# Patient Record
Sex: FEMALE | Race: WHITE | Employment: UNEMPLOYED | URBAN - METROPOLITAN AREA
[De-identification: names, ages, dates, MRNs, and addresses within clinical notes are randomized per-mention and may not be internally consistent; named-entity substitution may affect disease eponyms.]

---

## 2019-02-27 ENCOUNTER — TELEPHONE (OUTPATIENT)
Dept: OBGYN CLINIC | Facility: CLINIC | Age: 34
End: 2019-02-27

## 2019-02-27 NOTE — TELEPHONE ENCOUNTER
PT AGREED TO SEE GROUP. BOOKED OBN APPT 3-20-19. PT WILL BE SEEING PCP TOMORROW AND HAS REQUESTED A URINE AND BLOOD TEST. ADVISED TO BRING A COPY OF THE RESULT TO HER APPT SO WE DON'T HAVE TO DO URINE PREG TEST AT THE TIME OF OBN APPT.    ADVISED TO STAR

## 2019-03-20 ENCOUNTER — LAB ENCOUNTER (OUTPATIENT)
Dept: LAB | Facility: HOSPITAL | Age: 34
End: 2019-03-20
Attending: OBSTETRICS & GYNECOLOGY
Payer: COMMERCIAL

## 2019-03-20 ENCOUNTER — NURSE ONLY (OUTPATIENT)
Dept: OBGYN CLINIC | Facility: CLINIC | Age: 34
End: 2019-03-20
Payer: COMMERCIAL

## 2019-03-20 VITALS — HEIGHT: 66 IN | WEIGHT: 196 LBS | BODY MASS INDEX: 31.5 KG/M2

## 2019-03-20 DIAGNOSIS — Z34.81 ENCOUNTER FOR SUPERVISION OF OTHER NORMAL PREGNANCY IN FIRST TRIMESTER: ICD-10-CM

## 2019-03-20 DIAGNOSIS — Z34.81 ENCOUNTER FOR SUPERVISION OF OTHER NORMAL PREGNANCY IN FIRST TRIMESTER: Primary | ICD-10-CM

## 2019-03-20 LAB
ANTIBODY SCREEN: NEGATIVE
BASOPHILS # BLD AUTO: 0.04 X10(3) UL (ref 0–0.2)
BASOPHILS NFR BLD AUTO: 0.4 %
DEPRECATED RDW RBC AUTO: 48.4 FL (ref 35.1–46.3)
EOSINOPHIL # BLD AUTO: 0.11 X10(3) UL (ref 0–0.7)
EOSINOPHIL NFR BLD AUTO: 1.2 %
ERYTHROCYTE [DISTWIDTH] IN BLOOD BY AUTOMATED COUNT: 14.3 % (ref 11–15)
HBV SURFACE AG SER-ACNC: <0.1 [IU]/L
HBV SURFACE AG SERPL QL IA: NONREACTIVE
HCT VFR BLD AUTO: 41.3 % (ref 35–48)
HCV AB SERPL QL IA: NONREACTIVE
HGB BLD-MCNC: 13.3 G/DL (ref 12–16)
IMM GRANULOCYTES # BLD AUTO: 0.04 X10(3) UL (ref 0–1)
IMM GRANULOCYTES NFR BLD: 0.4 %
LYMPHOCYTES # BLD AUTO: 2.21 X10(3) UL (ref 1–4)
LYMPHOCYTES NFR BLD AUTO: 24.8 %
MCH RBC QN AUTO: 30 PG (ref 26–34)
MCHC RBC AUTO-ENTMCNC: 32.2 G/DL (ref 31–37)
MCV RBC AUTO: 93 FL (ref 80–100)
MONOCYTES # BLD AUTO: 0.74 X10(3) UL (ref 0.1–1)
MONOCYTES NFR BLD AUTO: 8.3 %
NEUTROPHILS # BLD AUTO: 5.76 X10 (3) UL (ref 1.5–7.7)
NEUTROPHILS # BLD AUTO: 5.76 X10(3) UL (ref 1.5–7.7)
NEUTROPHILS NFR BLD AUTO: 64.9 %
PLATELET # BLD AUTO: 233 10(3)UL (ref 150–450)
RBC # BLD AUTO: 4.44 X10(6)UL (ref 3.8–5.3)
RH BLOOD TYPE: POSITIVE
RUBV IGG SER QL: POSITIVE
RUBV IGG SER-ACNC: 75.5 IU/ML (ref 10–?)
WBC # BLD AUTO: 8.9 X10(3) UL (ref 4–11)

## 2019-03-20 PROCEDURE — 87086 URINE CULTURE/COLONY COUNT: CPT

## 2019-03-20 PROCEDURE — 86850 RBC ANTIBODY SCREEN: CPT

## 2019-03-20 PROCEDURE — 86900 BLOOD TYPING SEROLOGIC ABO: CPT

## 2019-03-20 PROCEDURE — 86780 TREPONEMA PALLIDUM: CPT

## 2019-03-20 PROCEDURE — 86787 VARICELLA-ZOSTER ANTIBODY: CPT

## 2019-03-20 PROCEDURE — 87389 HIV-1 AG W/HIV-1&-2 AB AG IA: CPT

## 2019-03-20 PROCEDURE — 87340 HEPATITIS B SURFACE AG IA: CPT

## 2019-03-20 PROCEDURE — 86762 RUBELLA ANTIBODY: CPT

## 2019-03-20 PROCEDURE — 85025 COMPLETE CBC W/AUTO DIFF WBC: CPT

## 2019-03-20 PROCEDURE — 86803 HEPATITIS C AB TEST: CPT

## 2019-03-20 PROCEDURE — 86901 BLOOD TYPING SEROLOGIC RH(D): CPT

## 2019-03-20 PROCEDURE — 36415 COLL VENOUS BLD VENIPUNCTURE: CPT

## 2019-03-20 RX ORDER — GLYCERIN/MINERAL OIL
LOTION (ML) TOPICAL
COMMUNITY
End: 2019-05-25

## 2019-03-20 NOTE — PROGRESS NOTES
Pt seen for OBN appt today with no complaints. Normal PN labs ordered, 1 hr gtt, Hep C and varicella. Pt advised all labs must be completed and resulted prior to MD appt.   Pt walked to  to schedule NPN appt with MD.    Pt brought in proof of pr 12/2018. Pt going to [facility] 3/2019.  (Partner is not going.)  Informed pt that it is not rec to go to [facility].   Pets No        GENETICS SCREENING    Patient greater than 35 Yes    Canavan Disease  No    Cystic Fibrosis No    Down Syndrome No    Hemophilia

## 2019-03-22 LAB
T PALLIDUM AB SER QL: NEGATIVE
VZV IGG SER IA-ACNC: 421.1 (ref 165–?)

## 2019-03-23 ENCOUNTER — APPOINTMENT (OUTPATIENT)
Dept: LAB | Facility: HOSPITAL | Age: 34
End: 2019-03-23
Attending: OBSTETRICS & GYNECOLOGY
Payer: COMMERCIAL

## 2019-03-23 DIAGNOSIS — Z34.81 ENCOUNTER FOR SUPERVISION OF OTHER NORMAL PREGNANCY IN FIRST TRIMESTER: ICD-10-CM

## 2019-03-23 LAB — GLUCOSE 1H P GLC SERPL-MCNC: 77 MG/DL

## 2019-03-23 PROCEDURE — 36415 COLL VENOUS BLD VENIPUNCTURE: CPT

## 2019-03-23 PROCEDURE — 82950 GLUCOSE TEST: CPT

## 2019-03-27 ENCOUNTER — INITIAL PRENATAL (OUTPATIENT)
Dept: OBGYN CLINIC | Facility: CLINIC | Age: 34
End: 2019-03-27
Payer: COMMERCIAL

## 2019-03-27 VITALS
HEART RATE: 76 BPM | DIASTOLIC BLOOD PRESSURE: 76 MMHG | SYSTOLIC BLOOD PRESSURE: 121 MMHG | BODY MASS INDEX: 32 KG/M2 | WEIGHT: 196 LBS

## 2019-03-27 DIAGNOSIS — Z34.81 ENCOUNTER FOR SUPERVISION OF OTHER NORMAL PREGNANCY IN FIRST TRIMESTER: Primary | ICD-10-CM

## 2019-03-27 PROBLEM — O09.521 MULTIGRAVIDA OF ADVANCED MATERNAL AGE IN FIRST TRIMESTER: Status: ACTIVE | Noted: 2019-03-27

## 2019-03-27 PROBLEM — Z20.821 EXPOSURE TO ZIKA VIRUS: Status: ACTIVE | Noted: 2019-03-27

## 2019-03-27 PROBLEM — Z86.19 H/O HERPES GENITALIS: Status: ACTIVE | Noted: 2019-03-27

## 2019-03-27 LAB
APPEARANCE: CLEAR
MULTISTIX LOT#: NORMAL NUMERIC
URINE-COLOR: YELLOW

## 2019-03-27 PROCEDURE — 81002 URINALYSIS NONAUTO W/O SCOPE: CPT | Performed by: OBSTETRICS & GYNECOLOGY

## 2019-03-27 PROCEDURE — 76815 OB US LIMITED FETUS(S): CPT | Performed by: OBSTETRICS & GYNECOLOGY

## 2019-03-27 NOTE — PROGRESS NOTES
New OB. No complaints. Was in [facility] within the last 6 mo and going to [place] tomorrow for vacation. Discussed zika risks. Encouraged mosquito repellant, long sleeves, and pants. Will need Level 2. Considering genetics. PE wnl.   Pap/STD screen toda

## 2019-03-28 LAB
C TRACH DNA SPEC QL NAA+PROBE: NEGATIVE
HPV I/H RISK 1 DNA SPEC QL NAA+PROBE: NEGATIVE
LAST PAP RESULT: NORMAL
N GONORRHOEA DNA SPEC QL NAA+PROBE: NEGATIVE

## 2019-03-29 LAB — T VAGINALIS RRNA SPEC QL NAA+PROBE: NEGATIVE

## 2019-04-02 ENCOUNTER — TELEPHONE (OUTPATIENT)
Dept: OBGYN CLINIC | Facility: CLINIC | Age: 34
End: 2019-04-02

## 2019-04-02 NOTE — TELEPHONE ENCOUNTER
Pt is 9w3d and asking what she can take for allergies. States she just came back from [facility] and symptoms started there. Informed pt we advise against travel to [facility] due to risk to zika exposure. Pt responded \"my doctor is aware\".  Also stated \"I didn'

## 2019-04-25 ENCOUNTER — ROUTINE PRENATAL (OUTPATIENT)
Dept: OBGYN CLINIC | Facility: CLINIC | Age: 34
End: 2019-04-25
Payer: COMMERCIAL

## 2019-04-25 VITALS
BODY MASS INDEX: 32 KG/M2 | DIASTOLIC BLOOD PRESSURE: 74 MMHG | HEART RATE: 78 BPM | WEIGHT: 201 LBS | SYSTOLIC BLOOD PRESSURE: 114 MMHG

## 2019-04-25 DIAGNOSIS — Z34.91 ENCOUNTER FOR SUPERVISION OF NORMAL PREGNANCY IN FIRST TRIMESTER, UNSPECIFIED GRAVIDITY: Primary | ICD-10-CM

## 2019-04-25 PROCEDURE — 81002 URINALYSIS NONAUTO W/O SCOPE: CPT | Performed by: OBSTETRICS & GYNECOLOGY

## 2019-04-26 ENCOUNTER — TELEPHONE (OUTPATIENT)
Dept: OBGYN CLINIC | Facility: CLINIC | Age: 34
End: 2019-04-26

## 2019-04-26 DIAGNOSIS — Z20.821 EXPOSURE TO ZIKA VIRUS: ICD-10-CM

## 2019-04-26 DIAGNOSIS — Z36.9 FIRST TRIMESTER SCREENING: Primary | ICD-10-CM

## 2019-04-26 DIAGNOSIS — O09.522 AMA (ADVANCED MATERNAL AGE) MULTIGRAVIDA 35+, SECOND TRIMESTER: ICD-10-CM

## 2019-04-26 NOTE — TELEPHONE ENCOUNTER
PT NOTIFIED ORDER FOR FTS AND ADDITIONAL ORDER FOR CONSULT AND LEVEL II HAVE BEEN PLACED. PHONE NUMBER GIVEN TO MAKE HER APPTS.

## 2019-04-26 NOTE — TELEPHONE ENCOUNTER
Pt saw DENNY yesterday he said someone would call her regarding a genetic test & ultrasound, pt waiting for call

## 2019-05-01 ENCOUNTER — HOSPITAL ENCOUNTER (OUTPATIENT)
Dept: PERINATAL CARE | Facility: HOSPITAL | Age: 34
Discharge: HOME | End: 2019-05-01
Attending: OBSTETRICS & GYNECOLOGY
Payer: COMMERCIAL

## 2019-05-01 VITALS
SYSTOLIC BLOOD PRESSURE: 116 MMHG | HEART RATE: 77 BPM | BODY MASS INDEX: 32 KG/M2 | DIASTOLIC BLOOD PRESSURE: 81 MMHG | WEIGHT: 201 LBS

## 2019-05-01 DIAGNOSIS — O09.521 MULTIGRAVIDA OF ADVANCED MATERNAL AGE IN FIRST TRIMESTER: ICD-10-CM

## 2019-05-01 DIAGNOSIS — Z20.821 EXPOSURE TO ZIKA VIRUS: ICD-10-CM

## 2019-05-01 DIAGNOSIS — Z36.9 FIRST TRIMESTER SCREENING: Primary | ICD-10-CM

## 2019-05-01 DIAGNOSIS — Z36.9 FIRST TRIMESTER SCREENING: ICD-10-CM

## 2019-05-01 PROCEDURE — 99243 OFF/OP CNSLTJ NEW/EST LOW 30: CPT | Performed by: OBSTETRICS & GYNECOLOGY

## 2019-05-01 PROCEDURE — 76813 OB US NUCHAL MEAS 1 GEST: CPT | Performed by: OBSTETRICS & GYNECOLOGY

## 2019-05-01 NOTE — PROGRESS NOTES
Reason for Consult:   Dear Dr. Conchita Varma,    Thank you for requesting ultrasound evaluation and maternal fetal medicine consultation on [facility]. As you are aware she is a 37year old female with a Gonzalez pregnancy at 13w4d.   A maternal-feta CHOLECYSTECTOMY  2006   • COLPOSCOPY, CERVIX W/UPPER ADJACENT VAGINA; W/BIOPSY(S), CERVIX  2004      No family history on file.    Social History    Tobacco Use      Smoking status: Former Smoker        Packs/day: 0.25        Years: 15.00        Pack years: The two most common medical problems complicating these  pregnanccies are hypertension and diabetes.    The incidence of preeclampsia in the general obstetric population is 3 to 4 percent; this increases to 5 to 10 percent in women over age 36 and is as hig ultrasound (level II) is advised even if the fetus has a normal karyotype.       Fetal Aneuploidy      Invasive Testing  I offered invasive genetic testing (amniocentesis, chorionic villus sampling) after reviewing the diagnostic accuracy of these tests as consistent with dermoid. IMPRESSION:   IUP at  13w4d   Scan consistent with dates  No fetal structural abnormalities seen today but limited by early gestational age  AMA- declined invasive genetic testing.   Right ovarian mass noted measuring 3.8cm x 2

## 2019-05-06 ENCOUNTER — NURSE ONLY (OUTPATIENT)
Dept: OBGYN CLINIC | Facility: CLINIC | Age: 34
End: 2019-05-06
Payer: COMMERCIAL

## 2019-05-06 ENCOUNTER — TELEPHONE (OUTPATIENT)
Dept: OBGYN CLINIC | Facility: CLINIC | Age: 34
End: 2019-05-06

## 2019-05-06 VITALS
BODY MASS INDEX: 33 KG/M2 | DIASTOLIC BLOOD PRESSURE: 66 MMHG | SYSTOLIC BLOOD PRESSURE: 112 MMHG | WEIGHT: 203 LBS | HEART RATE: 84 BPM

## 2019-05-06 DIAGNOSIS — Z34.82 ENCOUNTER FOR SUPERVISION OF OTHER NORMAL PREGNANCY IN SECOND TRIMESTER: Primary | ICD-10-CM

## 2019-05-06 PROCEDURE — 99211 OFF/OP EST MAY X REQ PHY/QHP: CPT | Performed by: OBSTETRICS & GYNECOLOGY

## 2019-05-06 PROCEDURE — 81002 URINALYSIS NONAUTO W/O SCOPE: CPT | Performed by: OBSTETRICS & GYNECOLOGY

## 2019-05-06 NOTE — PROGRESS NOTES
Pt here for bp check. Pt c/o of headache, since last week of Wednesday. Pt last used Tylenol on Saturday, 5/4/19. Pt has not taken Tylenol since Saturday. Pt has a headache and  a little nausea.   Denies abd pain, dizziness, vomiting, visual changes,

## 2019-05-06 NOTE — TELEPHONE ENCOUNTER
Pt states for one week she has a headache. Denies visual changes, epigastric pain, chest pain, sob or hx of htn. Denies history of migraines. Pt states that she takes Tylenol sometimes, but randomly. Informed pt to take Tylenol as prescribed on the bottle. Informed pt to have a caffeine drink. Informed pt to come in for a bp check today. Pt made an appt today to see the nurse for a bp check. Sent to DENNY to sign off.

## 2019-05-08 ENCOUNTER — TELEPHONE (OUTPATIENT)
Dept: PERINATAL CARE | Facility: HOSPITAL | Age: 34
End: 2019-05-08

## 2019-05-08 NOTE — TELEPHONE ENCOUNTER
HARMONY screening results obt  Reviewed by MD valdovinos  Low risk for  Trisomy 21  [id number] risk  Low risk for Trisomy 18/13  1:[id number] risk    Fetal sex: held for     Pt states understanding  Copy of results sent for scanning into pt record

## 2019-05-10 ENCOUNTER — TELEPHONE (OUTPATIENT)
Dept: OBGYN CLINIC | Facility: CLINIC | Age: 34
End: 2019-05-10

## 2019-05-25 ENCOUNTER — ROUTINE PRENATAL (OUTPATIENT)
Dept: OBGYN CLINIC | Facility: CLINIC | Age: 34
End: 2019-05-25
Payer: COMMERCIAL

## 2019-05-25 ENCOUNTER — APPOINTMENT (OUTPATIENT)
Dept: LAB | Facility: HOSPITAL | Age: 34
End: 2019-05-25
Attending: OBSTETRICS & GYNECOLOGY
Payer: COMMERCIAL

## 2019-05-25 VITALS
SYSTOLIC BLOOD PRESSURE: 111 MMHG | DIASTOLIC BLOOD PRESSURE: 75 MMHG | HEART RATE: 96 BPM | BODY MASS INDEX: 32 KG/M2 | WEIGHT: 200.63 LBS

## 2019-05-25 DIAGNOSIS — Z34.92 ENCOUNTER FOR SUPERVISION OF NORMAL PREGNANCY IN SECOND TRIMESTER, UNSPECIFIED GRAVIDITY: ICD-10-CM

## 2019-05-25 DIAGNOSIS — Z34.92 ENCOUNTER FOR SUPERVISION OF NORMAL PREGNANCY IN SECOND TRIMESTER, UNSPECIFIED GRAVIDITY: Primary | ICD-10-CM

## 2019-05-25 PROCEDURE — 81002 URINALYSIS NONAUTO W/O SCOPE: CPT | Performed by: OBSTETRICS & GYNECOLOGY

## 2019-05-25 PROCEDURE — 36415 COLL VENOUS BLD VENIPUNCTURE: CPT

## 2019-05-25 PROCEDURE — 82105 ALPHA-FETOPROTEIN SERUM: CPT

## 2019-05-25 RX ORDER — .BETA.-CAROTENE, ASCORBIC ACID, CHOLECALCIFEROL, .ALPHA.-TOCOPHEROL ACETATE, DL-, THIAMINE, RIBOFLAVIN, NIACINAMIDE, PYRIDOXINE HYDROCHLORIDE, FOLIC ACID, CYANOCOBALAMIN, CALCIUM PANTOTHENATE, CALCIUM CARBONATE, FERROUS FUMARATE, ZINC OXIDE AND DOCUSATE SODIUM 1000; 100; 400; 30; 3; 3; 15; 20; 1; 12; 7; 200; 29; 20; 25 [IU]/1; MG/1; [IU]/1; MG/1; MG/1; MG/1; MG/1; MG/1; MG/1; UG/1; MG/1; MG/1; MG/1; MG/1; MG/1
TABLET ORAL
Refills: 4 | COMMUNITY
Start: 2019-05-16 | End: 2020-03-18

## 2019-06-01 ENCOUNTER — TELEPHONE (OUTPATIENT)
Dept: OBGYN CLINIC | Facility: CLINIC | Age: 34
End: 2019-06-01

## 2019-06-01 NOTE — TELEPHONE ENCOUNTER
Pt is 18w0d, reports she thinks she is having \"a nervous breakdown\". Reports she feels she \"cannot breath\". States she was up all night crying and vomiting. Asked pt if something happened that led to this? pt responded, \"Yes, my boyfriend broke up with me\". Asked pt if she has had thoughts of hurting herself or others. Pt was silent for a few seconds and responded yes. States she had a thought of hurting herself but she knows she would \"not do it\". Advised pt she needs to be seen in the ER. Pt states she does not want to be admitted. Advised pt going to the ER does not mean she will be admitted. Advised she will be assessed and a tx plan will be provided such as meds to help her calm down or a consult to a therapist. Informed pt she should not drive and if she does not have someone to take her to the ER then nurse can call an ambulance for her. Pt states she will call her friend. Again advised pt on the importance of being evaluated today. Informed we will f/u with her on Monday. Pt agrees.

## 2019-06-11 NOTE — TELEPHONE ENCOUNTER
Multiple calls made to pt for well being check and no response. Certified letter mailed to pt's home address. PN appt scheduled on 6/21/19 with [facility]. Routed to [facility] on call as guerita.

## 2019-06-12 NOTE — TELEPHONE ENCOUNTER
Please make sure that all contacts for this patient have been contacted first.  If there is no response or no other contacts listed then we are obligated to notify the police for a \"well check\".   Please involve [facility] in this immediately

## 2019-06-12 NOTE — TELEPHONE ENCOUNTER
Spoke with pt for well being check. Pt states she did not go to ED for evaluation as \"I am fine I don't need anything\". Pt denies thoughts of harming herself or other. Pt states she \"had a moment of weakness but I am fine\". Pt states she has her children at home and when asked if she has a support sytem at home pt states \"I have friends and I'm okay\". Offered pt to initiate a referral for [facility]. Pt declines and states \"I don't need that I said I'm okay\". Informed pt if she develops thought of harming herself or others pt needs to go to nearest ED for evaluation. Advised pt to call if she needs anything. Pt verbalized understanding. Intercepted certified letter as pt was contacted today via phone so certified letter was not sent. Message routed to [facility] as Mathew Davis.

## 2019-06-14 NOTE — PROGRESS NOTES
Armida Miller  Dear Dr. Maria Del Rosario Diaz,     Thank you for requesting ultrasound evaluation and maternal fetal medicine consultation on your patient Herseverett Sanchez.   As you are aware she is a 37year old female  with a S fetuses with Down syndrome and 2-3% percent of euploid fetuses. Aneuploidy is present in 0.3 to 0.9 percent of fetuses with isolated pyelectasis.   Other studies have found the likelihood ratio for Down syndrome of isolated pyelectasis to be 1.08, or the in transplantation and Laboratory exposure. Transmission of Zika virus infection through breastfeeding has not been described. Microcephaly, ocular abnormalities, CNS, hydrops, IUGR and fetal loss have been reported.  Ultrasound is the major modality used t negative serologic test result obtained 2-12 weeks after travel cannot definitively rule out Zika virus infection. Outbreaks of [place] virus are ongoing in many countries in the [place], [facility] and [place].  Evidence suggesting an association o (serum and urine). If negative then Plaque reduction neutralization test.     The [facility] is no longer recommended testing for asymptomatic pregnant women who have traveled to areas with [facility] [address] travel notices.   However it can still be offered if a patient is maternal age, low risk NIPT.   Invasive testing declined  · Possible Zika exposure  · Right adnexal mass, probable dermoid     RECOMMENDATIONS:  · Continue care with Dr. Yenifer Schaeffer  · Weekly NST at 36 weeks  · Third trimester ultrasound & repeat evaluation o

## 2019-06-17 NOTE — TELEPHONE ENCOUNTER
5/1/19 [place]ike report signed by [address] [place] [address] and sent for scanning.

## 2019-06-21 ENCOUNTER — HOSPITAL ENCOUNTER (OUTPATIENT)
Dept: PERINATAL CARE | Facility: HOSPITAL | Age: 34
Discharge: HOME | End: 2019-06-21
Attending: OBSTETRICS & GYNECOLOGY
Payer: COMMERCIAL

## 2019-06-21 VITALS
DIASTOLIC BLOOD PRESSURE: 70 MMHG | SYSTOLIC BLOOD PRESSURE: 116 MMHG | HEART RATE: 80 BPM | WEIGHT: 205 LBS | BODY MASS INDEX: 33 KG/M2

## 2019-06-21 DIAGNOSIS — O35.8XX0 PYELECTASIS OF FETUS ON PRENATAL ULTRASOUND: ICD-10-CM

## 2019-06-21 DIAGNOSIS — O09.522 AMA (ADVANCED MATERNAL AGE) MULTIGRAVIDA 35+, SECOND TRIMESTER: Primary | ICD-10-CM

## 2019-06-21 DIAGNOSIS — N94.89 ADNEXAL MASS: ICD-10-CM

## 2019-06-21 DIAGNOSIS — O09.522 AMA (ADVANCED MATERNAL AGE) MULTIGRAVIDA 35+, SECOND TRIMESTER: ICD-10-CM

## 2019-06-21 DIAGNOSIS — Z20.821 EXPOSURE TO ZIKA VIRUS: ICD-10-CM

## 2019-06-21 PROCEDURE — 76811 OB US DETAILED SNGL FETUS: CPT | Performed by: OBSTETRICS & GYNECOLOGY

## 2019-06-21 PROCEDURE — 99215 OFFICE O/P EST HI 40 MIN: CPT | Performed by: OBSTETRICS & GYNECOLOGY

## 2019-06-28 ENCOUNTER — ROUTINE PRENATAL (OUTPATIENT)
Dept: OBGYN CLINIC | Facility: CLINIC | Age: 34
End: 2019-06-28
Payer: COMMERCIAL

## 2019-06-28 VITALS
DIASTOLIC BLOOD PRESSURE: 75 MMHG | BODY MASS INDEX: 34 KG/M2 | HEART RATE: 85 BPM | WEIGHT: 208 LBS | SYSTOLIC BLOOD PRESSURE: 114 MMHG

## 2019-06-28 DIAGNOSIS — Z34.92 ENCOUNTER FOR SUPERVISION OF NORMAL PREGNANCY IN SECOND TRIMESTER, UNSPECIFIED GRAVIDITY: Primary | ICD-10-CM

## 2019-06-28 PROCEDURE — 81002 URINALYSIS NONAUTO W/O SCOPE: CPT | Performed by: OBSTETRICS & GYNECOLOGY

## 2019-07-11 NOTE — TELEPHONE ENCOUNTER
C/O VERY EMOTIONAL AND DEPRESSED. PT WAS TEARFUL ON THE PHONE.  STATES SHE HAS PEOPLE CLOSE TO HER BUT SHE CANNOT CONFIDE IN ANY OF THEM COMPLETELY, CANNOT SHARE ALL HER THOUGHTS. SHE DENIES ANY FEELINGS OF HARMING SELF OR OTHERS.   ADMITS SHE NEEDS SOME

## 2019-07-16 NOTE — TELEPHONE ENCOUNTER
Spoke with pt. Pt states she keeps missing phone call from [facility]. Pt states she \"feels much better and I think I'm ok now\". Advised pt she needs to f/u with [facility] and the importance of talking with someone. Pt denies harming herself or others.  Pt states she wi

## 2019-07-19 ENCOUNTER — ROUTINE PRENATAL (OUTPATIENT)
Dept: OBGYN CLINIC | Facility: CLINIC | Age: 34
End: 2019-07-19
Payer: COMMERCIAL

## 2019-07-19 VITALS
BODY MASS INDEX: 34 KG/M2 | DIASTOLIC BLOOD PRESSURE: 73 MMHG | SYSTOLIC BLOOD PRESSURE: 115 MMHG | WEIGHT: 210.19 LBS | HEART RATE: 83 BPM

## 2019-07-19 DIAGNOSIS — Z34.92 ENCOUNTER FOR SUPERVISION OF NORMAL PREGNANCY IN SECOND TRIMESTER, UNSPECIFIED GRAVIDITY: Primary | ICD-10-CM

## 2019-07-19 PROBLEM — N83.8 OVARIAN MASS, RIGHT: Status: ACTIVE | Noted: 2019-07-19

## 2019-07-19 PROBLEM — N13.30 PYELECTASIS: Status: ACTIVE | Noted: 2019-07-19

## 2019-07-19 LAB
MULTISTIX LOT#: NORMAL NUMERIC
PH, URINE: 6.5 (ref 4.5–8)
SPECIFIC GRAVITY: 1.02 (ref 1–1.03)
UROBILINOGEN,SEMI-QN: 0 MG/DL (ref 0–1.9)

## 2019-07-19 PROCEDURE — 81002 URINALYSIS NONAUTO W/O SCOPE: CPT | Performed by: OBSTETRICS & GYNECOLOGY

## 2019-07-20 ENCOUNTER — APPOINTMENT (OUTPATIENT)
Dept: LAB | Facility: HOSPITAL | Age: 34
End: 2019-07-20
Attending: OBSTETRICS & GYNECOLOGY
Payer: COMMERCIAL

## 2019-07-20 DIAGNOSIS — Z34.92 ENCOUNTER FOR SUPERVISION OF NORMAL PREGNANCY IN SECOND TRIMESTER, UNSPECIFIED GRAVIDITY: ICD-10-CM

## 2019-07-20 LAB
DEPRECATED RDW RBC AUTO: 45.9 FL (ref 35.1–46.3)
ERYTHROCYTE [DISTWIDTH] IN BLOOD BY AUTOMATED COUNT: 13.6 % (ref 11–15)
GLUCOSE 1H P GLC SERPL-MCNC: 136 MG/DL
HCT VFR BLD AUTO: 33.3 % (ref 35–48)
HGB BLD-MCNC: 10.8 G/DL (ref 12–16)
MCH RBC QN AUTO: 29.8 PG (ref 26–34)
MCHC RBC AUTO-ENTMCNC: 32.4 G/DL (ref 31–37)
MCV RBC AUTO: 92 FL (ref 80–100)
PLATELET # BLD AUTO: 205 10(3)UL (ref 150–450)
RBC # BLD AUTO: 3.62 X10(6)UL (ref 3.8–5.3)
WBC # BLD AUTO: 9.2 X10(3) UL (ref 4–11)

## 2019-07-20 PROCEDURE — 82950 GLUCOSE TEST: CPT

## 2019-07-20 PROCEDURE — 36415 COLL VENOUS BLD VENIPUNCTURE: CPT

## 2019-07-20 PROCEDURE — 85027 COMPLETE CBC AUTOMATED: CPT

## 2019-07-22 ENCOUNTER — PATIENT MESSAGE (OUTPATIENT)
Dept: OBGYN CLINIC | Facility: CLINIC | Age: 34
End: 2019-07-22

## 2019-07-22 NOTE — TELEPHONE ENCOUNTER
From: Golden Aguilar  To: Benedicto Marcos MD  Sent: 7/22/2019 12:27 PM CDT  Subject: Test Results Question    I have a question about CBC, PLATELET; NO DIFFERENTIAL resulted on 7/20/19, 8:42 AM.    Should be worried about the 3 that are in the yellow?

## 2019-08-02 ENCOUNTER — TELEPHONE (OUTPATIENT)
Dept: OBGYN CLINIC | Facility: CLINIC | Age: 34
End: 2019-08-02

## 2019-08-02 ENCOUNTER — HOSPITAL ENCOUNTER (OUTPATIENT)
Facility: HOSPITAL | Age: 34
Setting detail: OBSERVATION
Discharge: HOME | End: 2019-08-02
Attending: OBSTETRICS & GYNECOLOGY | Admitting: OBSTETRICS & GYNECOLOGY
Payer: COMMERCIAL

## 2019-08-02 VITALS — DIASTOLIC BLOOD PRESSURE: 76 MMHG | HEART RATE: 97 BPM | SYSTOLIC BLOOD PRESSURE: 133 MMHG

## 2019-08-02 PROBLEM — Z34.90 PREGNANCY: Status: ACTIVE | Noted: 2019-08-02

## 2019-08-02 PROCEDURE — 59025 FETAL NON-STRESS TEST: CPT | Performed by: OBSTETRICS & GYNECOLOGY

## 2019-08-02 NOTE — PROGRESS NOTES
Pt is a 37year old female admitted to TR3/TR3-A. Patient presents with:  Decreased Fetal Movement: pt. states that she slipped and fell on her back 19 at approx 1900     Pt is  26w6d intra-uterine pregnancy. History obtained, pt.  Oriented to

## 2019-08-02 NOTE — TELEPHONE ENCOUNTER
On call---26 week IUP c/o falling yesterday on her buttocks and dec FM since. Didn't call office. Hasn't felt much mov't since fall. Tried eating and drinking without much stimulation. Rec'd to come to [facility] now for evaluation. Charge RN notified.

## 2019-08-02 NOTE — TRIAGE
[facility]      Triage Note    Bryan Gallo Patient Status:  Observation    1985 MRN [id number]   Location [address] Attending Laura Kc, [facility] Day # 0 PCP No primary care provider on file Order to discharge obtained. Pt. To follow up in the office. Discharged home  Ambulatory and in stable condition with written and verbal  labor, and preeclampsia instructions. Patient verbalizes understanding of information given.        Axel Augustin

## 2019-08-03 ENCOUNTER — LABORATORY ENCOUNTER (OUTPATIENT)
Dept: LAB | Facility: HOSPITAL | Age: 34
End: 2019-08-03
Attending: OBSTETRICS & GYNECOLOGY
Payer: COMMERCIAL

## 2019-08-03 DIAGNOSIS — R73.09 ELEVATED GLUCOSE: ICD-10-CM

## 2019-08-03 LAB
1 HR GLUCOSE GESTATIONAL: 72 MG/DL
GLUCOSE 1H P GLC SERPL-MCNC: 94 MG/DL
GLUCOSE 3H P GLC SERPL-MCNC: 80 MG/DL
GLUCOSE P FAST SERPL-MCNC: 80 MG/DL

## 2019-08-03 PROCEDURE — 82951 GLUCOSE TOLERANCE TEST (GTT): CPT

## 2019-08-03 PROCEDURE — 82952 GTT-ADDED SAMPLES: CPT

## 2019-08-03 PROCEDURE — 36415 COLL VENOUS BLD VENIPUNCTURE: CPT

## 2019-08-16 ENCOUNTER — ROUTINE PRENATAL (OUTPATIENT)
Dept: OBGYN CLINIC | Facility: CLINIC | Age: 34
End: 2019-08-16
Payer: COMMERCIAL

## 2019-08-16 VITALS
SYSTOLIC BLOOD PRESSURE: 107 MMHG | HEART RATE: 88 BPM | DIASTOLIC BLOOD PRESSURE: 71 MMHG | WEIGHT: 214.38 LBS | BODY MASS INDEX: 35 KG/M2

## 2019-08-16 DIAGNOSIS — Z34.82 ENCOUNTER FOR SUPERVISION OF OTHER NORMAL PREGNANCY IN SECOND TRIMESTER: Primary | ICD-10-CM

## 2019-08-16 LAB
APPEARANCE: CLEAR
MULTISTIX LOT#: NORMAL NUMERIC
PH, URINE: 7 (ref 4.5–8)
SPECIFIC GRAVITY: 1 (ref 1–1.03)
URINE-COLOR: YELLOW
UROBILINOGEN,SEMI-QN: 0.2 MG/DL (ref 0–1.9)

## 2019-08-16 PROCEDURE — 81002 URINALYSIS NONAUTO W/O SCOPE: CPT | Performed by: OBSTETRICS & GYNECOLOGY

## 2019-08-16 RX ORDER — VALACYCLOVIR HYDROCHLORIDE 500 MG/1
TABLET, FILM COATED ORAL
Refills: 2 | Status: ON HOLD | COMMUNITY
Start: 2019-07-26 | End: 2019-11-01

## 2019-08-29 ENCOUNTER — ROUTINE PRENATAL (OUTPATIENT)
Dept: OBGYN CLINIC | Facility: CLINIC | Age: 34
End: 2019-08-29
Payer: COMMERCIAL

## 2019-08-29 VITALS
BODY MASS INDEX: 35 KG/M2 | SYSTOLIC BLOOD PRESSURE: 106 MMHG | HEART RATE: 86 BPM | DIASTOLIC BLOOD PRESSURE: 69 MMHG | WEIGHT: 216.81 LBS

## 2019-08-29 DIAGNOSIS — Z34.93 ENCOUNTER FOR SUPERVISION OF NORMAL PREGNANCY IN THIRD TRIMESTER, UNSPECIFIED GRAVIDITY: Primary | ICD-10-CM

## 2019-08-29 LAB
APPEARANCE: CLEAR
MULTISTIX LOT#: NORMAL NUMERIC
PH, URINE: 5.5 (ref 4.5–8)
SPECIFIC GRAVITY: 1.01 (ref 1–1.03)
URINE-COLOR: YELLOW
UROBILINOGEN,SEMI-QN: 0.2 MG/DL (ref 0–1.9)

## 2019-08-29 PROCEDURE — 81002 URINALYSIS NONAUTO W/O SCOPE: CPT | Performed by: OBSTETRICS & GYNECOLOGY

## 2019-08-31 ENCOUNTER — TELEPHONE (OUTPATIENT)
Dept: OBGYN CLINIC | Facility: CLINIC | Age: 34
End: 2019-08-31

## 2019-09-11 ENCOUNTER — ROUTINE PRENATAL (OUTPATIENT)
Dept: OBGYN CLINIC | Facility: CLINIC | Age: 34
End: 2019-09-11
Payer: COMMERCIAL

## 2019-09-11 ENCOUNTER — HOSPITAL ENCOUNTER (OUTPATIENT)
Dept: PERINATAL CARE | Facility: HOSPITAL | Age: 34
Discharge: HOME | End: 2019-09-11
Attending: OBSTETRICS & GYNECOLOGY
Payer: COMMERCIAL

## 2019-09-11 VITALS
DIASTOLIC BLOOD PRESSURE: 69 MMHG | HEART RATE: 88 BPM | BODY MASS INDEX: 35 KG/M2 | SYSTOLIC BLOOD PRESSURE: 122 MMHG | WEIGHT: 216 LBS

## 2019-09-11 VITALS
DIASTOLIC BLOOD PRESSURE: 76 MMHG | HEART RATE: 88 BPM | SYSTOLIC BLOOD PRESSURE: 121 MMHG | BODY MASS INDEX: 35 KG/M2 | WEIGHT: 216.19 LBS

## 2019-09-11 DIAGNOSIS — Z34.93 ENCOUNTER FOR SUPERVISION OF NORMAL PREGNANCY IN THIRD TRIMESTER, UNSPECIFIED GRAVIDITY: Primary | ICD-10-CM

## 2019-09-11 DIAGNOSIS — N83.8 OVARIAN MASS, RIGHT: ICD-10-CM

## 2019-09-11 DIAGNOSIS — O35.8XX0 FETAL HYDRONEPHROSIS DURING PREGNANCY, ANTEPARTUM, SINGLE OR UNSPECIFIED FETUS: ICD-10-CM

## 2019-09-11 DIAGNOSIS — N13.30 PYELECTASIS: ICD-10-CM

## 2019-09-11 DIAGNOSIS — O09.523 AMA (ADVANCED MATERNAL AGE) MULTIGRAVIDA 35+, THIRD TRIMESTER: ICD-10-CM

## 2019-09-11 DIAGNOSIS — O09.523 AMA (ADVANCED MATERNAL AGE) MULTIGRAVIDA 35+, THIRD TRIMESTER: Primary | ICD-10-CM

## 2019-09-11 LAB
MULTISTIX LOT#: NORMAL NUMERIC
PH, URINE: 6 (ref 4.5–8)
SPECIFIC GRAVITY: 1.02 (ref 1–1.03)
UROBILINOGEN,SEMI-QN: 0 MG/DL (ref 0–1.9)

## 2019-09-11 PROCEDURE — 76816 OB US FOLLOW-UP PER FETUS: CPT | Performed by: OBSTETRICS & GYNECOLOGY

## 2019-09-11 PROCEDURE — 81002 URINALYSIS NONAUTO W/O SCOPE: CPT | Performed by: OBSTETRICS & GYNECOLOGY

## 2019-09-11 PROCEDURE — 99214 OFFICE O/P EST MOD 30 MIN: CPT | Performed by: OBSTETRICS & GYNECOLOGY

## 2019-09-11 NOTE — PROGRESS NOTES
Yovany Warner  Dear Dr. Mercedes Carballo,     Thank you for requesting ultrasound evaluation and maternal fetal medicine consultation on your patient Liban Kaiser.   As you are aware she is a 37year old female  with a S ultrasound. Adnexal mass   The right ovarian solid cystic mass is minimally changed from her prior ultrasound. Since it is small it does not need to be removed during pregnancy but should be evaluated after delivery.     We discussed the recommended yenifer 32w4d  2. Scan consistent with dates at 82nd%  3. No fetal structural abnormalities seen  4. Bilateral mild fetal hydronephrosis  5. Polyhydramnios  6. Advanced maternal age, low risk NIPT. Invasive testing declined  7. Possible Zika exposure  8.   Righ

## 2019-09-19 NOTE — TELEPHONE ENCOUNTER
Dr. Sal Rodriguez,     Please sign off on form: FMLA 1-12 wks maternity leave     -Highlight the patient and hit \"Chart\" button. -In Chart Review, w/in the Encounter tab - click 1 time on the Telephone call encounter for 8/31/2019.  Scroll down the telephone

## 2019-09-20 NOTE — TELEPHONE ENCOUNTER
FMLA form has been completed and faxed to [address] [place] [address]. [phone]    [facility] email

## 2019-09-25 ENCOUNTER — TELEPHONE (OUTPATIENT)
Dept: OBGYN CLINIC | Facility: CLINIC | Age: 34
End: 2019-09-25

## 2019-09-25 ENCOUNTER — ROUTINE PRENATAL (OUTPATIENT)
Dept: OBGYN CLINIC | Facility: CLINIC | Age: 34
End: 2019-09-25
Payer: COMMERCIAL

## 2019-09-25 VITALS
SYSTOLIC BLOOD PRESSURE: 120 MMHG | BODY MASS INDEX: 36 KG/M2 | HEART RATE: 86 BPM | DIASTOLIC BLOOD PRESSURE: 77 MMHG | WEIGHT: 220.63 LBS

## 2019-09-25 DIAGNOSIS — Z34.93 ENCOUNTER FOR SUPERVISION OF NORMAL PREGNANCY IN THIRD TRIMESTER, UNSPECIFIED GRAVIDITY: Primary | ICD-10-CM

## 2019-09-25 PROBLEM — O40.3XX0 POLYHYDRAMNIOS IN THIRD TRIMESTER: Status: ACTIVE | Noted: 2019-09-25

## 2019-09-25 PROCEDURE — 81002 URINALYSIS NONAUTO W/O SCOPE: CPT | Performed by: OBSTETRICS & GYNECOLOGY

## 2019-09-25 PROCEDURE — 90471 IMMUNIZATION ADMIN: CPT | Performed by: OBSTETRICS & GYNECOLOGY

## 2019-09-25 PROCEDURE — 90715 TDAP VACCINE 7 YRS/> IM: CPT | Performed by: OBSTETRICS & GYNECOLOGY

## 2019-09-25 RX ORDER — VALACYCLOVIR HYDROCHLORIDE 500 MG/1
500 TABLET, FILM COATED ORAL 2 TIMES DAILY
Qty: 30 TABLET | Refills: 1 | Status: ON HOLD | OUTPATIENT
Start: 2019-09-25 | End: 2019-11-01

## 2019-09-25 NOTE — PROGRESS NOTES
Left foot slightly more swollen. No pain, redness, warmth. TDAP today. Growth reviewed 82% with Poly CHELSEY 24cm. Staff message sent to Dr. Naseem Lutz regarding timing if IOL necessary with CHELSEY. Valtrex at 36 weeks sent to pharmacy. NST order given.   Cesia

## 2019-09-26 ENCOUNTER — TELEPHONE (OUTPATIENT)
Dept: OBGYN CLINIC | Facility: CLINIC | Age: 34
End: 2019-09-26

## 2019-09-26 NOTE — TELEPHONE ENCOUNTER
Routed to [address] [place] [address] to ask if staff message was sent to [facility]?

## 2019-09-26 NOTE — TELEPHONE ENCOUNTER
PER PATIENT REQUESTING TO KNOW IF DR. Daphnie Stevens / SEND A NOTE TO DR Graig Seip REGARDING HER US RESULTS / PLEASE ADVISE

## 2019-09-28 ENCOUNTER — TELEPHONE (OUTPATIENT)
Dept: OBGYN CLINIC | Facility: CLINIC | Age: 34
End: 2019-09-28

## 2019-09-28 DIAGNOSIS — O40.9XX0 POLYHYDRAMNIOS, ANTEPARTUM, SINGLE OR UNSPECIFIED FETUS: Primary | ICD-10-CM

## 2019-09-28 NOTE — TELEPHONE ENCOUNTER
Pt needs weekly NSTs for polyhydramnios. She is scheduled to start at 36 weeks for AMA, however given the new finding, I'd like her to start them this week. Have her schedule. Thanks!

## 2019-10-02 ENCOUNTER — APPOINTMENT (OUTPATIENT)
Dept: OBGYN CLINIC | Facility: HOSPITAL | Age: 34
End: 2019-10-02
Payer: COMMERCIAL

## 2019-10-02 ENCOUNTER — HOSPITAL ENCOUNTER (OUTPATIENT)
Facility: HOSPITAL | Age: 34
Discharge: HOME | End: 2019-10-02
Attending: OBSTETRICS & GYNECOLOGY | Admitting: OBSTETRICS & GYNECOLOGY
Payer: COMMERCIAL

## 2019-10-02 VITALS — DIASTOLIC BLOOD PRESSURE: 66 MMHG | HEART RATE: 85 BPM | SYSTOLIC BLOOD PRESSURE: 136 MMHG | RESPIRATION RATE: 16 BRPM

## 2019-10-02 PROCEDURE — 59025 FETAL NON-STRESS TEST: CPT | Performed by: OBSTETRICS & GYNECOLOGY

## 2019-10-02 NOTE — NST
Nonstress Test   Patient: eLnny Cruz    Gestation: 35w4d    NST: scheduled NST for AMA and polyhydramnios     Variability: Moderate           Accelerations: Yes           Decelerations: None            Baseline: 140 BPM           Uterine Irritability:

## 2019-10-09 ENCOUNTER — TELEPHONE (OUTPATIENT)
Dept: OBGYN CLINIC | Facility: CLINIC | Age: 34
End: 2019-10-09

## 2019-10-09 ENCOUNTER — HOSPITAL ENCOUNTER (OUTPATIENT)
Dept: PERINATAL CARE | Facility: HOSPITAL | Age: 34
Discharge: HOME | End: 2019-10-09
Attending: OBSTETRICS & GYNECOLOGY
Payer: COMMERCIAL

## 2019-10-09 ENCOUNTER — ROUTINE PRENATAL (OUTPATIENT)
Dept: OBGYN CLINIC | Facility: CLINIC | Age: 34
End: 2019-10-09
Payer: COMMERCIAL

## 2019-10-09 ENCOUNTER — APPOINTMENT (OUTPATIENT)
Dept: LAB | Facility: HOSPITAL | Age: 34
End: 2019-10-09
Attending: OBSTETRICS & GYNECOLOGY
Payer: COMMERCIAL

## 2019-10-09 VITALS
BODY MASS INDEX: 36 KG/M2 | HEART RATE: 88 BPM | DIASTOLIC BLOOD PRESSURE: 76 MMHG | SYSTOLIC BLOOD PRESSURE: 120 MMHG | WEIGHT: 222.63 LBS

## 2019-10-09 DIAGNOSIS — Z34.93 ENCOUNTER FOR SUPERVISION OF NORMAL PREGNANCY IN THIRD TRIMESTER, UNSPECIFIED GRAVIDITY: ICD-10-CM

## 2019-10-09 DIAGNOSIS — Z34.93 ENCOUNTER FOR SUPERVISION OF NORMAL PREGNANCY IN THIRD TRIMESTER, UNSPECIFIED GRAVIDITY: Primary | ICD-10-CM

## 2019-10-09 PROCEDURE — 59025 FETAL NON-STRESS TEST: CPT | Performed by: OBSTETRICS & GYNECOLOGY

## 2019-10-09 PROCEDURE — 85027 COMPLETE CBC AUTOMATED: CPT | Performed by: OBSTETRICS & GYNECOLOGY

## 2019-10-09 PROCEDURE — 81002 URINALYSIS NONAUTO W/O SCOPE: CPT | Performed by: OBSTETRICS & GYNECOLOGY

## 2019-10-09 PROCEDURE — 87389 HIV-1 AG W/HIV-1&-2 AB AG IA: CPT | Performed by: OBSTETRICS & GYNECOLOGY

## 2019-10-09 PROCEDURE — 36415 COLL VENOUS BLD VENIPUNCTURE: CPT | Performed by: OBSTETRICS & GYNECOLOGY

## 2019-10-09 PROCEDURE — 86780 TREPONEMA PALLIDUM: CPT | Performed by: OBSTETRICS & GYNECOLOGY

## 2019-10-09 RX ORDER — BREAST PUMP
EACH MISCELLANEOUS
Qty: 1 EACH | Refills: 0 | Status: SHIPPED | OUTPATIENT
Start: 2019-10-09 | End: 2020-03-18

## 2019-10-09 NOTE — PROGRESS NOTES
Complains of pelvic pressure. Reviewed she needs to continue walking. GBS today. Kick counts and labor precautions.    RTC 1 wk

## 2019-10-09 NOTE — NST
Nonstress Test   Patient: Patrick Varma    Gestation: 36w4d    NST: ama       Variability: Moderate           Accelerations: Yes           Decelerations: None            Baseline: 140 BPM           Uterine Irritability: No           Contractions: Irregul

## 2019-10-11 PROBLEM — B95.1 POSITIVE GBS TEST: Status: ACTIVE | Noted: 2019-10-11

## 2019-10-11 NOTE — TELEPHONE ENCOUNTER
Dr. Issa Salguero    Please sign off on form:  -Highlight the patient and hit \"Chart\" button. -In Chart Review, w/in the Encounter tab - click 1 time on the Telephone call encounter for 9/25/19.  Scroll down the telephone encounter.  -Click \"scan on\" blue

## 2019-10-14 NOTE — TELEPHONE ENCOUNTER
Patient called stating Disab. needs to go to her HR attn: Maddy Cardozo [phone] not [facility]. Refaxed.  SC

## 2019-10-15 ENCOUNTER — ROUTINE PRENATAL (OUTPATIENT)
Dept: OBGYN CLINIC | Facility: CLINIC | Age: 34
End: 2019-10-15
Payer: COMMERCIAL

## 2019-10-15 ENCOUNTER — HOSPITAL ENCOUNTER (OUTPATIENT)
Dept: PERINATAL CARE | Facility: HOSPITAL | Age: 34
Discharge: HOME | End: 2019-10-15
Attending: OBSTETRICS & GYNECOLOGY
Payer: COMMERCIAL

## 2019-10-15 VITALS
WEIGHT: 224 LBS | BODY MASS INDEX: 36 KG/M2 | DIASTOLIC BLOOD PRESSURE: 71 MMHG | HEART RATE: 93 BPM | SYSTOLIC BLOOD PRESSURE: 107 MMHG

## 2019-10-15 DIAGNOSIS — Z34.93 ENCOUNTER FOR SUPERVISION OF NORMAL PREGNANCY IN THIRD TRIMESTER, UNSPECIFIED GRAVIDITY: ICD-10-CM

## 2019-10-15 DIAGNOSIS — Z34.93 ENCOUNTER FOR SUPERVISION OF NORMAL PREGNANCY IN THIRD TRIMESTER, UNSPECIFIED GRAVIDITY: Primary | ICD-10-CM

## 2019-10-15 PROCEDURE — 59025 FETAL NON-STRESS TEST: CPT

## 2019-10-15 PROCEDURE — 81002 URINALYSIS NONAUTO W/O SCOPE: CPT | Performed by: OBSTETRICS & GYNECOLOGY

## 2019-10-15 NOTE — NST
Nonstress Test   Patient: Daniele Hyde    Gestation: 37w3d    NST: ama       Variability: Moderate           Accelerations: Yes           Decelerations: None            Baseline: 135 BPM           Uterine Irritability: No           Contractions: Irregul

## 2019-10-16 ENCOUNTER — TELEPHONE (OUTPATIENT)
Dept: OBGYN CLINIC | Facility: CLINIC | Age: 34
End: 2019-10-16

## 2019-10-16 NOTE — TELEPHONE ENCOUNTER
Pt states she saw Larry Weinberg yesterday and he told her she would be induced on 10/27/19 at 7pm.  Pt states she then received a eoSemi alert that her induction is scheduled at 7am.  Pt is wondering why the change in time.   Pt informed we will check with [facility] and sarah

## 2019-10-17 ENCOUNTER — TELEPHONE (OUTPATIENT)
Dept: OBGYN CLINIC | Facility: CLINIC | Age: 34
End: 2019-10-17

## 2019-10-17 NOTE — TELEPHONE ENCOUNTER
C/O BEGAN HAVING SOME CLITORAL ITCHING LATER IN THE DAY AFTER HER APPT ON TUES. DENIES ANY CHANGE IN VAGINAL DISCHARGE (STILL CLEAR WITHOUT ODOR), DENIES ANY CHANGES IN LOTIONS, SOAPS OR DETERGENTS.   SUGGESTED USING HYDROCORTISONE CREAM (PER PACKAGE INSTR

## 2019-10-21 ENCOUNTER — TELEPHONE (OUTPATIENT)
Dept: OBGYN UNIT | Facility: HOSPITAL | Age: 34
End: 2019-10-21

## 2019-10-23 ENCOUNTER — TELEPHONE (OUTPATIENT)
Dept: OBGYN CLINIC | Facility: CLINIC | Age: 34
End: 2019-10-23

## 2019-10-23 ENCOUNTER — ROUTINE PRENATAL (OUTPATIENT)
Dept: OBGYN CLINIC | Facility: CLINIC | Age: 34
End: 2019-10-23
Payer: COMMERCIAL

## 2019-10-23 ENCOUNTER — HOSPITAL ENCOUNTER (OUTPATIENT)
Dept: PERINATAL CARE | Facility: HOSPITAL | Age: 34
Discharge: HOME | End: 2019-10-23
Attending: OBSTETRICS & GYNECOLOGY
Payer: COMMERCIAL

## 2019-10-23 VITALS
BODY MASS INDEX: 36 KG/M2 | HEART RATE: 91 BPM | WEIGHT: 222.19 LBS | DIASTOLIC BLOOD PRESSURE: 74 MMHG | SYSTOLIC BLOOD PRESSURE: 111 MMHG

## 2019-10-23 DIAGNOSIS — Z34.93 ENCOUNTER FOR SUPERVISION OF NORMAL PREGNANCY IN THIRD TRIMESTER, UNSPECIFIED GRAVIDITY: Primary | ICD-10-CM

## 2019-10-23 DIAGNOSIS — Z34.93 ENCOUNTER FOR SUPERVISION OF NORMAL PREGNANCY IN THIRD TRIMESTER, UNSPECIFIED GRAVIDITY: ICD-10-CM

## 2019-10-23 PROCEDURE — 59025 FETAL NON-STRESS TEST: CPT | Performed by: OBSTETRICS & GYNECOLOGY

## 2019-10-23 PROCEDURE — 81002 URINALYSIS NONAUTO W/O SCOPE: CPT | Performed by: OBSTETRICS & GYNECOLOGY

## 2019-10-27 ENCOUNTER — HOSPITAL ENCOUNTER (INPATIENT)
Facility: HOSPITAL | Age: 34
LOS: 6 days | Discharge: HOME | End: 2019-11-02
Attending: OBSTETRICS & GYNECOLOGY | Admitting: OBSTETRICS & GYNECOLOGY
Payer: COMMERCIAL

## 2019-10-27 ENCOUNTER — APPOINTMENT (OUTPATIENT)
Dept: OBGYN CLINIC | Facility: HOSPITAL | Age: 34
End: 2019-10-27
Payer: COMMERCIAL

## 2019-10-27 RX ORDER — ONDANSETRON 2 MG/ML
4 INJECTION INTRAMUSCULAR; INTRAVENOUS EVERY 6 HOURS PRN
Status: DISCONTINUED | OUTPATIENT
Start: 2019-10-27 | End: 2019-10-29 | Stop reason: HOSPADM

## 2019-10-27 RX ORDER — TERBUTALINE SULFATE 1 MG/ML
0.25 INJECTION, SOLUTION SUBCUTANEOUS AS NEEDED
Status: DISCONTINUED | OUTPATIENT
Start: 2019-10-27 | End: 2019-10-29 | Stop reason: HOSPADM

## 2019-10-27 RX ORDER — LIDOCAINE HYDROCHLORIDE 10 MG/ML
30 INJECTION, SOLUTION EPIDURAL; INFILTRATION; INTRACAUDAL; PERINEURAL ONCE
Status: DISCONTINUED | OUTPATIENT
Start: 2019-10-27 | End: 2019-10-29 | Stop reason: HOSPADM

## 2019-10-27 RX ORDER — NALBUPHINE HCL 10 MG/ML
10 AMPUL (ML) INJECTION
Status: DISCONTINUED | OUTPATIENT
Start: 2019-10-27 | End: 2019-10-29

## 2019-10-27 RX ORDER — HYDROXYZINE HYDROCHLORIDE 50 MG/ML
25 INJECTION, SOLUTION INTRAMUSCULAR EVERY 4 HOURS PRN
Status: DISCONTINUED | OUTPATIENT
Start: 2019-10-27 | End: 2019-10-27

## 2019-10-27 RX ORDER — SODIUM CHLORIDE 0.9 % (FLUSH) 0.9 %
10 SYRINGE (ML) INJECTION AS NEEDED
Status: DISCONTINUED | OUTPATIENT
Start: 2019-10-27 | End: 2019-10-29 | Stop reason: HOSPADM

## 2019-10-27 RX ORDER — AMMONIA INHALANTS 0.04 G/.3ML
0.3 INHALANT RESPIRATORY (INHALATION) AS NEEDED
Status: DISCONTINUED | OUTPATIENT
Start: 2019-10-27 | End: 2019-10-29 | Stop reason: HOSPADM

## 2019-10-27 RX ORDER — DEXTROSE, SODIUM CHLORIDE, SODIUM LACTATE, POTASSIUM CHLORIDE, AND CALCIUM CHLORIDE 5; .6; .31; .03; .02 G/100ML; G/100ML; G/100ML; G/100ML; G/100ML
INJECTION, SOLUTION INTRAVENOUS CONTINUOUS
Status: DISCONTINUED | OUTPATIENT
Start: 2019-10-27 | End: 2019-10-29 | Stop reason: HOSPADM

## 2019-10-27 RX ORDER — SODIUM CHLORIDE, SODIUM LACTATE, POTASSIUM CHLORIDE, CALCIUM CHLORIDE 600; 310; 30; 20 MG/100ML; MG/100ML; MG/100ML; MG/100ML
INJECTION, SOLUTION INTRAVENOUS CONTINUOUS
Status: DISCONTINUED | OUTPATIENT
Start: 2019-10-27 | End: 2019-10-29 | Stop reason: HOSPADM

## 2019-10-27 RX ORDER — HYDROXYZINE HYDROCHLORIDE 50 MG/ML
50 INJECTION, SOLUTION INTRAMUSCULAR EVERY 4 HOURS PRN
Status: DISCONTINUED | OUTPATIENT
Start: 2019-10-27 | End: 2019-10-29

## 2019-10-27 RX ORDER — TRISODIUM CITRATE DIHYDRATE AND CITRIC ACID MONOHYDRATE 500; 334 MG/5ML; MG/5ML
30 SOLUTION ORAL AS NEEDED
Status: COMPLETED | OUTPATIENT
Start: 2019-10-27 | End: 2019-10-29

## 2019-10-28 ENCOUNTER — ANESTHESIA (OUTPATIENT)
Dept: OBGYN UNIT | Facility: HOSPITAL | Age: 34
End: 2019-10-28
Payer: COMMERCIAL

## 2019-10-28 ENCOUNTER — ANESTHESIA EVENT (OUTPATIENT)
Dept: OBGYN UNIT | Facility: HOSPITAL | Age: 34
End: 2019-10-28
Payer: COMMERCIAL

## 2019-10-28 PROCEDURE — 10H07YZ INSERTION OF OTHER DEVICE INTO PRODUCTS OF CONCEPTION, VIA NATURAL OR ARTIFICIAL OPENING: ICD-10-PCS | Performed by: OBSTETRICS & GYNECOLOGY

## 2019-10-28 RX ORDER — LIDOCAINE HYDROCHLORIDE 10 MG/ML
INJECTION, SOLUTION INFILTRATION; PERINEURAL
Status: COMPLETED | OUTPATIENT
Start: 2019-10-28 | End: 2019-10-28

## 2019-10-28 RX ORDER — BUPIVACAINE HYDROCHLORIDE 2.5 MG/ML
INJECTION, SOLUTION EPIDURAL; INFILTRATION; INTRACAUDAL
Status: COMPLETED | OUTPATIENT
Start: 2019-10-28 | End: 2019-10-28

## 2019-10-28 RX ORDER — LIDOCAINE HYDROCHLORIDE AND EPINEPHRINE 15; 5 MG/ML; UG/ML
INJECTION, SOLUTION EPIDURAL
Status: COMPLETED | OUTPATIENT
Start: 2019-10-28 | End: 2019-10-28

## 2019-10-28 RX ORDER — METOCLOPRAMIDE HYDROCHLORIDE 5 MG/ML
INJECTION INTRAMUSCULAR; INTRAVENOUS
Status: COMPLETED
Start: 2019-10-28 | End: 2019-10-29

## 2019-10-28 RX ORDER — EPHEDRINE SULFATE/0.9% NACL/PF 25 MG/5 ML
5 SYRINGE (ML) INTRAVENOUS AS NEEDED
Status: DISCONTINUED | OUTPATIENT
Start: 2019-10-28 | End: 2019-10-29

## 2019-10-28 RX ORDER — BUPIVACAINE HYDROCHLORIDE 2.5 MG/ML
30 INJECTION, SOLUTION EPIDURAL; INFILTRATION; INTRACAUDAL ONCE
Status: DISCONTINUED | OUTPATIENT
Start: 2019-10-28 | End: 2019-10-29

## 2019-10-28 RX ORDER — LIDOCAINE HYDROCHLORIDE AND EPINEPHRINE 20; 5 MG/ML; UG/ML
20 INJECTION, SOLUTION EPIDURAL; INFILTRATION; INTRACAUDAL; PERINEURAL ONCE
Status: COMPLETED | OUTPATIENT
Start: 2019-10-28 | End: 2019-10-29

## 2019-10-28 RX ORDER — CEFAZOLIN SODIUM/WATER 2 G/20 ML
SYRINGE (ML) INTRAVENOUS
Status: DISPENSED
Start: 2019-10-28 | End: 2019-10-29

## 2019-10-28 RX ORDER — FAMOTIDINE 10 MG/ML
INJECTION, SOLUTION INTRAVENOUS
Status: COMPLETED
Start: 2019-10-28 | End: 2019-10-29

## 2019-10-28 RX ORDER — BUPIVACAINE HYDROCHLORIDE 2.5 MG/ML
INJECTION, SOLUTION EPIDURAL; INFILTRATION; INTRACAUDAL
Status: DISPENSED
Start: 2019-10-28 | End: 2019-10-29

## 2019-10-28 RX ORDER — NALBUPHINE HCL 10 MG/ML
2.5 AMPUL (ML) INJECTION
Status: DISCONTINUED | OUTPATIENT
Start: 2019-10-28 | End: 2019-10-29

## 2019-10-28 RX ADMIN — LIDOCAINE HYDROCHLORIDE 5 ML: 10 INJECTION, SOLUTION INFILTRATION; PERINEURAL at 11:44:00

## 2019-10-28 RX ADMIN — BUPIVACAINE HYDROCHLORIDE 10 ML: 2.5 INJECTION, SOLUTION EPIDURAL; INFILTRATION; INTRACAUDAL at 11:44:00

## 2019-10-28 RX ADMIN — LIDOCAINE HYDROCHLORIDE AND EPINEPHRINE 3 ML: 15; 5 INJECTION, SOLUTION EPIDURAL at 11:44:00

## 2019-10-28 NOTE — H&P
[facility] Patient Status:  Inpatient    1985 MRN [id number]   Location [address]  Attending Dominique West MD   Hosp Day # 0 PCP No primary care provider on file Pack years: 3.75        Quit date: 2019        Years since quittin.6      Smokeless tobacco: Never Used    Alcohol use: No      Frequency: Never      Comment: Before pregnancy would have 2-6 drinks at a party      Family History:  No pertinent fam

## 2019-10-28 NOTE — PROGRESS NOTES
Pt has increased pain and marivel more frequently. Per Dr. Humberto Carrizales pull cervadil now and check pt. Pit orders will be done in the morning if pt cnx decrease.  [facility]

## 2019-10-28 NOTE — PROGRESS NOTES
10/28/2019, 6:01 PM    Subjective:    Called by RN for [employer] in 90s. Pitocin was on 6 mu/min. Pit off. O2 on.   Position change    Objective:   10/28/19  1715 10/28/19  1720 10/28/19  1737 10/28/19  1745   BP: 134/76 134/76 114/54 133/76   BP Location:

## 2019-10-28 NOTE — PLAN OF CARE
Problem: BIRTH - VAGINAL/ SECTION  Goal: Fetal and maternal status remain reassuring during the birth process  Description  INTERVENTIONS:  - Monitor vital signs  - Monitor fetal heart rate  - Monitor uterine activity  - Monitor labor progression negative...

## 2019-10-28 NOTE — PROGRESS NOTES
Pt is a 37year old female admitted to 377/377-A. Patient presents with:  Scheduled Induction: AMA, polyhydramnios      Pt is [id number] 39w1d intra-uterine pregnancy. History obtained, consents signed. Oriented to room, staff, and plan of care.

## 2019-10-28 NOTE — ANESTHESIA PROCEDURE NOTES
Labor Analgesia  Date/Time: 10/28/2019 11:44 AM  Performed by: Froy Hicks MD  Authorized by: Froy Hicks MD     Patient Location:  OB  Start Time:  10/28/2019 11:37 AM  Site Identification: surface landmarks    Reason for Block: labor epidural

## 2019-10-28 NOTE — PLAN OF CARE
Problem: Patient Centered Care  Goal: Patient preferences are identified and integrated in the patient's plan of care  Description  Interventions:  - What would you like us to know as we care for you?  Pt would like an epidural at some point, and is consi alternative coping skills  - Provide emotional support with 1:1 interaction with staff  Outcome: Progressing

## 2019-10-28 NOTE — PROGRESS NOTES
[facility]    Labor Progress Note    Gonsalo Warner Patient Status:  Inpatient    1985 MRN [id number]   Location [facility] Attending Jake Francisco MD   Hosp Day # 1 PCP No primary care provider on flory

## 2019-10-28 NOTE — ANESTHESIA PREPROCEDURE EVALUATION
Anesthesia PreOp Note    HPI:     Daniele Hyde is a 37year old female who presents for preoperative consultation requested by: * No surgeons listed *    Date of Surgery: 10/28/2019    * No procedures listed *  Indication: * No pre-op diagnosis entered potassium 2.5 Million Units in sodium chloride 0.9% 100 mL IVPB, 2.5 Million Units, Intravenous, Q4H, Aidan Fontaine MD  oxyTOCIN (PITOCIN) 30 units/ 500 ml 0.9% NS premix infusion, 0.5-20 lorie-units/min, Intravenous, Continuous, Rudi Oliveros MD, Serenity Christian Marisol Oliveira MD  Nalbuphine HCl (NUBAIN) injection 10 mg, 10 mg, Intravenous, Q3H PRN, Page, Marisol Oliveira MD, 10 mg at 10/27/19 9929  fentaNYL citrate (SUBLIMAZE) 0.05 MG/ML injection 100 mcg, 100 mcg, Intravenous, Once, Dominique West MD, Stopped at 10/27/1 abused: Not on file        Physically abused: Not on file        Forced sexual activity: Not on file    Other Topics      Concerns:        Not on file    Social History Narrative      Not on file      Available pre-op labs reviewed.   Lab Results   Componen

## 2019-10-28 NOTE — PROGRESS NOTES
10/28/2019, 4:45 PM    Subjective:    Comfortable with epidural.       Objective:   10/28/19  1530 10/28/19  1545 10/28/19  1600 10/28/19  1615   BP: 132/72 134/68 119/53 117/53   BP Location:       Pulse: 79 78 85 92   Resp:  16     Temp:  98.3 °F (36.8 °

## 2019-10-29 PROCEDURE — 59510 CESAREAN DELIVERY: CPT | Performed by: OBSTETRICS & GYNECOLOGY

## 2019-10-29 RX ORDER — CHOLECALCIFEROL (VITAMIN D3) 25 MCG
1 TABLET,CHEWABLE ORAL DAILY
Status: DISCONTINUED | OUTPATIENT
Start: 2019-10-29 | End: 2019-11-02

## 2019-10-29 RX ORDER — METHYLERGONOVINE MALEATE 0.2 MG/ML
INJECTION INTRAVENOUS
Status: COMPLETED
Start: 2019-10-29 | End: 2019-10-29

## 2019-10-29 RX ORDER — HYDROMORPHONE HYDROCHLORIDE 1 MG/ML
0.5 INJECTION, SOLUTION INTRAMUSCULAR; INTRAVENOUS; SUBCUTANEOUS ONCE
Status: COMPLETED | OUTPATIENT
Start: 2019-10-29 | End: 2019-10-29

## 2019-10-29 RX ORDER — SIMETHICONE 80 MG
80 TABLET,CHEWABLE ORAL 3 TIMES DAILY PRN
Status: DISCONTINUED | OUTPATIENT
Start: 2019-10-29 | End: 2019-11-02

## 2019-10-29 RX ORDER — DOCUSATE SODIUM 100 MG/1
100 CAPSULE, LIQUID FILLED ORAL
Status: DISCONTINUED | OUTPATIENT
Start: 2019-10-29 | End: 2019-11-02

## 2019-10-29 RX ORDER — ENOXAPARIN SODIUM 100 MG/ML
40 INJECTION SUBCUTANEOUS DAILY
Status: DISCONTINUED | OUTPATIENT
Start: 2019-10-29 | End: 2019-11-02

## 2019-10-29 RX ORDER — EPHEDRINE SULFATE 50 MG/ML
INJECTION, SOLUTION INTRAVENOUS AS NEEDED
Status: DISCONTINUED | OUTPATIENT
Start: 2019-10-29 | End: 2019-10-29 | Stop reason: SURG

## 2019-10-29 RX ORDER — MISOPROSTOL 200 UG/1
TABLET ORAL
Status: COMPLETED
Start: 2019-10-29 | End: 2019-10-29

## 2019-10-29 RX ORDER — CARBOPROST TROMETHAMINE 250 UG/ML
INJECTION, SOLUTION INTRAMUSCULAR
Status: DISCONTINUED
Start: 2019-10-29 | End: 2019-10-29 | Stop reason: WASHOUT

## 2019-10-29 RX ORDER — SODIUM CHLORIDE 0.9 % (FLUSH) 0.9 %
10 SYRINGE (ML) INJECTION AS NEEDED
Status: DISCONTINUED | OUTPATIENT
Start: 2019-10-29 | End: 2019-11-02

## 2019-10-29 RX ORDER — ONDANSETRON 2 MG/ML
4 INJECTION INTRAMUSCULAR; INTRAVENOUS EVERY 6 HOURS PRN
Status: DISCONTINUED | OUTPATIENT
Start: 2019-10-29 | End: 2019-11-02

## 2019-10-29 RX ORDER — HYDROCODONE BITARTRATE AND ACETAMINOPHEN 7.5; 325 MG/1; MG/1
1 TABLET ORAL EVERY 4 HOURS PRN
Status: DISCONTINUED | OUTPATIENT
Start: 2019-10-29 | End: 2019-11-02

## 2019-10-29 RX ORDER — AMMONIA INHALANTS 0.04 G/.3ML
0.3 INHALANT RESPIRATORY (INHALATION) AS NEEDED
Status: DISCONTINUED | OUTPATIENT
Start: 2019-10-29 | End: 2019-11-02

## 2019-10-29 RX ORDER — NALBUPHINE HCL 10 MG/ML
2.5 AMPUL (ML) INJECTION EVERY 4 HOURS PRN
Status: DISCONTINUED | OUTPATIENT
Start: 2019-10-29 | End: 2019-11-02

## 2019-10-29 RX ORDER — POLYETHYLENE GLYCOL 3350 17 G/17G
17 POWDER, FOR SOLUTION ORAL DAILY PRN
Status: DISCONTINUED | OUTPATIENT
Start: 2019-10-29 | End: 2019-11-02

## 2019-10-29 RX ORDER — HYDROCODONE BITARTRATE AND ACETAMINOPHEN 7.5; 325 MG/1; MG/1
1 TABLET ORAL EVERY 4 HOURS PRN
Status: DISCONTINUED | OUTPATIENT
Start: 2019-10-30 | End: 2019-10-29

## 2019-10-29 RX ORDER — DEXTROSE, SODIUM CHLORIDE, SODIUM LACTATE, POTASSIUM CHLORIDE, AND CALCIUM CHLORIDE 5; .6; .31; .03; .02 G/100ML; G/100ML; G/100ML; G/100ML; G/100ML
INJECTION, SOLUTION INTRAVENOUS CONTINUOUS
Status: DISCONTINUED | OUTPATIENT
Start: 2019-10-29 | End: 2019-11-02

## 2019-10-29 RX ORDER — CEFAZOLIN SODIUM/WATER 2 G/20 ML
SYRINGE (ML) INTRAVENOUS AS NEEDED
Status: DISCONTINUED | OUTPATIENT
Start: 2019-10-29 | End: 2019-10-29 | Stop reason: SURG

## 2019-10-29 RX ORDER — BISACODYL 10 MG
10 SUPPOSITORY, RECTAL RECTAL
Status: DISCONTINUED | OUTPATIENT
Start: 2019-10-29 | End: 2019-11-02

## 2019-10-29 RX ORDER — DIPHENHYDRAMINE HYDROCHLORIDE 50 MG/ML
12.5 INJECTION INTRAMUSCULAR; INTRAVENOUS EVERY 4 HOURS PRN
Status: DISCONTINUED | OUTPATIENT
Start: 2019-10-29 | End: 2019-11-02

## 2019-10-29 RX ORDER — NALOXONE HYDROCHLORIDE 0.4 MG/ML
0.08 INJECTION, SOLUTION INTRAMUSCULAR; INTRAVENOUS; SUBCUTANEOUS
Status: DISCONTINUED | OUTPATIENT
Start: 2019-10-29 | End: 2019-11-02

## 2019-10-29 RX ORDER — SODIUM PHOSPHATE, DIBASIC AND SODIUM PHOSPHATE, MONOBASIC 7; 19 G/133ML; G/133ML
1 ENEMA RECTAL ONCE AS NEEDED
Status: DISCONTINUED | OUTPATIENT
Start: 2019-10-29 | End: 2019-11-02

## 2019-10-29 RX ADMIN — SODIUM CHLORIDE, SODIUM LACTATE, POTASSIUM CHLORIDE, CALCIUM CHLORIDE: 600; 310; 30; 20 INJECTION, SOLUTION INTRAVENOUS at 01:09:00

## 2019-10-29 RX ADMIN — CEFAZOLIN SODIUM/WATER 2 G: 2 G/20 ML SYRINGE (ML) INTRAVENOUS at 00:27:00

## 2019-10-29 RX ADMIN — SODIUM CHLORIDE, SODIUM LACTATE, POTASSIUM CHLORIDE, CALCIUM CHLORIDE: 600; 310; 30; 20 INJECTION, SOLUTION INTRAVENOUS at 00:24:00

## 2019-10-29 RX ADMIN — EPHEDRINE SULFATE 10 MG: 50 INJECTION, SOLUTION INTRAVENOUS at 00:40:00

## 2019-10-29 RX ADMIN — LIDOCAINE HYDROCHLORIDE AND EPINEPHRINE 15 ML: 20; 5 INJECTION, SOLUTION EPIDURAL; INFILTRATION; INTRACAUDAL; PERINEURAL at 00:29:00

## 2019-10-29 NOTE — DISCHARGE SUMMARY
[facility]    Discharge Summary    Sully Gilliland Patient Status:  Inpatient    1985 MRN [id number]   Location [facility] Attending Theresa Tyson MD   [address] Day # 6       Admission Date:  10/27/2019

## 2019-10-29 NOTE — LACTATION NOTE
This note was copied from a baby's chart.   LACTATION NOTE - INFANT    Evaluation Type  Evaluation Type: Inpatient    Problems & Assessment  Problems Diagnosed or Identified: Latch difficulty  Infant Assessment: Skin color: pink or appropriate for ethnicity 115

## 2019-10-29 NOTE — LACTATION NOTE
LACTATION NOTE - MOTHER      Evaluation Type: Inpatient    Problems identified  Problems identified: Knowledge deficit    Maternal history  Maternal history: Depression; Anxiety;AMA; Anemia    Breastfeeding goal  Breastfeeding goal: To maintain breast milk f

## 2019-10-29 NOTE — PROGRESS NOTES
10/29/2019, 12:12 AM    Subjective:    Has had temp of 100.7. Gentamicin added. Fetal heart tones:  FHT :  160s with moderate variability with occasional variable  Contractions: q 2-3 min x 50 mmHg change-- no pitocin    Cervical Exam:  Unchanged.   7/9

## 2019-10-29 NOTE — PLAN OF CARE
Pt alert but sleepy, pt on PCA Morphine, pca dosing only, pt complaints of pain, Norco started po per  [employer] with adequate relief, not using MS as much. Up to BR for tegan care with one assist slow steady, denies dizziness, up to chair for visitors.   [place] interaction with staff  Outcome: Progressing     Problem: POSTPARTUM  Goal: Long Term Goal:Experiences normal postpartum course  Description  INTERVENTIONS:  - Assess and monitor vital signs and lab values. - Assess fundus and lochia.   - Provide ice/sitz feeding.  - Assess and monitor for signs of nipple pain/trauma. - Instruct and provide assistance with proper latch. - Review techniques for milk expression (breast pumping) and storage of breast milk.  Provide pumping equipment/supplies, instructions and

## 2019-10-29 NOTE — ANESTHESIA POSTPROCEDURE EVALUATION
Patient: Daniele Hyde    Procedure Summary     Date:  10/28/19 Room / Location:  [address] [place] [address] L+D OR  [facility] L+D OR    Anesthesia Start:   Anesthesia Stop:  10/29/19 0118    Procedure:   SECTION (N/A ) Diagnosis:  (same as preop)    Surgeon:  Dian Morin

## 2019-10-29 NOTE — PROGRESS NOTES
[facility] Dosing Service    Initial Pharmacokinetic Consult for Aminoglycoside Dosing      Bryan Gallo is a 37year old female who is being treated for maternal infxn . Pharmacy has been asked to dose gentamicin  by Dr. Joshua Trevino.     She is allergic to i BUN/SCr daily while on gentamicin to assess renal function. 4.  Pharmacy will follow and monitor renal function changes, toxicity and efficacy. Pharmacy will continue to follow her. We appreciate the opportunity to assist in her care.     Joseph Gaspar

## 2019-10-29 NOTE — PROGRESS NOTES
10/28/2019, 10:14 PM    Feeling pressure. Fetal heart tones:  FHT :  150-160s with moderate variability. Decelerations: occasional variables. Occasional late decel  Contractions: q 2-3 min    Cervical Exam:  7/90/0 caput.   Cervix edematous  IUPC pl

## 2019-10-29 NOTE — PROGRESS NOTES
10/28/2019, 7:54 PM    Has been having late decels for approx 40 min from 1900    Fetal heart tones:  FHT :  160s with moderate variability. In last 15 min, it appears more early decel  Contractions: irregular.   Not on pitocin    Cervical Exam:  Unchanged

## 2019-10-29 NOTE — OPERATIVE REPORT
[facility] - [facility]     Section Delivery / Operative Note    Mindy Hou Patient Status:  Inpatient    1985 MRN [id number]   Location [address]  Attending Jami Sexton MD   Hosp Day # 2 PCP No prima handed off to the awaiting neonatologist.  The placenta was delivered spontaneously. Pitocin and methergine given for atony. The uterus was closed in two layer fashion. The first layer was closed with continuous locking stitch using a 0-Vicryl.   This

## 2019-10-29 NOTE — PROGRESS NOTES
Patient transferred to mother/baby room 355 per cart in stable condition with baby and personal belongings. Accompanied by  and staff. Report given to [facility].  [facility]

## 2019-10-29 NOTE — PROGRESS NOTES
[facility] Dosing Service    Initial Pharmacokinetic Consult for Aminoglycoside Dosing      Daniele Hyde is a 37year old female who is being treated for maternal infxn . Pharmacy has been asked to dose gentamicin  by Dr. Nga Palacios.     She is allergic to i BUN/SCr daily while on gentamicin to assess renal function. 4.  Pharmacy will follow and monitor renal function changes, toxicity and efficacy. Pharmacy will continue to follow her. We appreciate the opportunity to assist in her care.     Joseph Gaspar

## 2019-10-29 NOTE — PLAN OF CARE
Problem: Patient Centered Care  Goal: Patient preferences are identified and integrated in the patient's plan of care  Description  Interventions:  - What would you like us to know as we care for you?  Pt would like an epidural at some point, and is consi delivery)  - DVT prophylaxis (C/S delivery)  - Surgical antibiotic prophylaxis (C/S delivery)  Outcome: Completed

## 2019-10-30 ENCOUNTER — TELEPHONE (OUTPATIENT)
Dept: OBGYN CLINIC | Facility: CLINIC | Age: 34
End: 2019-10-30

## 2019-10-30 RX ORDER — HYDROCODONE BITARTRATE AND ACETAMINOPHEN 5; 325 MG/1; MG/1
2 TABLET ORAL EVERY 6 HOURS PRN
Status: DISCONTINUED | OUTPATIENT
Start: 2019-10-30 | End: 2019-11-02

## 2019-10-30 NOTE — TELEPHONE ENCOUNTER
[facility] is requesting confirmation for order of breast pump please advise

## 2019-10-30 NOTE — TELEPHONE ENCOUNTER
Spoke with Emmanuel. They state they faxed us an order for a breast pump on 10/24/19. I informed them that we did not receive any order from them. We did receive an order from [employer] on 10/23/19 that we faxed back.   Pt also requested a

## 2019-10-30 NOTE — PROGRESS NOTES
Post-Partum Note   10/30/2019, 8:04 AM    Subjective:  Patient doing well. Pain is well controlled. She is ambulating without lightheadedness or dizziness. Denies fevers or chills. Denies SOB, CP. She had pain control issues overnight and doing better now.

## 2019-10-30 NOTE — LACTATION NOTE
LACTATION NOTE - MOTHER      Evaluation Type: Inpatient    Problems identified  Problems identified: Knowledge deficit    Maternal history  Maternal history: Depression; Anxiety; Anemia; AMA    Breastfeeding goal  Breastfeeding goal: To maintain breast milk f

## 2019-10-30 NOTE — PLAN OF CARE
Problem: ANXIETY  Goal: Will report anxiety at manageable levels  Description  INTERVENTIONS:  - Administer medication as ordered  - Teach and rehearse alternative coping skills  - Provide emotional support with 1:1 interaction with staff  Outcome: Progr Encourage caregiver to participate in  daily care. - Assess support systems available to mother/family.  - Provide /case management support as needed.   Outcome: Progressing     Problem: GASTROINTESTINAL - ADULT  Goal: Minimal or abse

## 2019-10-31 NOTE — LACTATION NOTE
This note was copied from a baby's chart.   LACTATION NOTE - INFANT    Evaluation Type  Evaluation Type: Inpatient    Problems & Assessment  Problems: comment/detail: 9% wt loss, higher bili and first child under phototherapy  Muscle tone: Appropriate for G

## 2019-10-31 NOTE — PLAN OF CARE
Problem: POSTPARTUM  Goal: Optimize infant feeding at the breast  Description  INTERVENTIONS:  - Initiate breast feeding within first hour after birth. - Monitor effectiveness of current breast feeding efforts.   - Assess support systems available to mo engorgement. - Instruct on breast care. - Provide comfort measures.   Outcome: Progressing

## 2019-10-31 NOTE — PLAN OF CARE
Problem: Patient Centered Care  Goal: Patient preferences are identified and integrated in the patient's plan of care  Description  Interventions:  - What would you like us to know as we care for you?  Pt would like an epidural at some point, and is consi incision/episiotomy/laceration, and assess for signs and symptoms of infection and hematoma. - Assess bladder function and monitor for bladder distention.  - Provide/instruct/assist with pericare as needed. - Provide VTE prophylaxis as needed.   - Monitor feeding on demand.  - Encourage skin-to-skin contact. - Provide  support as needed. - Assess for and manage engorgement. - Provide breast feeding education handouts and information on community breast feeding support.    Outcome: Progressing  Goal: Est routine/schedule  - Consider collaborating with pharmacy to review patient's medication profile  Outcome: Progressing  Goal: Maintains adequate nutritional intake (undernourished)  Description  INTERVENTIONS:  - Monitor percentage of each meal consumed  -

## 2019-10-31 NOTE — PROGRESS NOTES
[facility]    OB/Gyne Post  Progress Note      [facility] Patient Status:  Inpatient    1985 MRN [id number]   Location [facility] 3SE Attending Albino Barton MD   Hosp Day # 4 PCP No primary care provider on file.

## 2019-10-31 NOTE — LACTATION NOTE
LACTATION NOTE - MOTHER      Evaluation Type: Inpatient    Problems identified  Problems identified: Knowledge deficit    Maternal history  Maternal history: AMA; Anxiety;Depression; Anemia    Breastfeeding goal  Breastfeeding goal: To maintain breast milk f

## 2019-11-01 PROBLEM — Z34.90 PREGNANCY: Status: RESOLVED | Noted: 2019-08-02 | Resolved: 2019-11-01

## 2019-11-01 RX ORDER — HYDROCODONE BITARTRATE AND ACETAMINOPHEN 7.5; 325 MG/1; MG/1
1 TABLET ORAL EVERY 6 HOURS PRN
Qty: 30 TABLET | Refills: 0 | Status: SHIPPED | OUTPATIENT
Start: 2019-11-01 | End: 2019-12-11

## 2019-11-01 NOTE — PLAN OF CARE
Problem: POSTPARTUM  Goal: Optimize infant feeding at the breast  Description  INTERVENTIONS:  - Initiate breast feeding within first hour after birth. - Monitor effectiveness of current breast feeding efforts.   - Assess support systems available to mo Assess for and manage engorgement. - Instruct on breast care. - Provide comfort measures.   Outcome: Adequate for Discharge

## 2019-11-01 NOTE — PROGRESS NOTES
[facility]    Post  Progress Note      Alda Hardyemily Patient Status:  Inpatient    1985 MRN [id number]   Location [facility] 3SE Attending Norma Huynh MD   Hosp Day # 5 PCP No primary care provider on file.

## 2019-11-02 VITALS
WEIGHT: 222 LBS | OXYGEN SATURATION: 98 % | RESPIRATION RATE: 16 BRPM | SYSTOLIC BLOOD PRESSURE: 133 MMHG | TEMPERATURE: 98 F | BODY MASS INDEX: 35.68 KG/M2 | HEART RATE: 75 BPM | DIASTOLIC BLOOD PRESSURE: 65 MMHG | HEIGHT: 66 IN

## 2019-11-02 NOTE — PLAN OF CARE
Problem: Patient Centered Care  Goal: Patient preferences are identified and integrated in the patient's plan of care  Description  Interventions:  - What would you like us to know as we care for you?  Pt would like an epidural at some point, and is consi antiemetic medications  - Provide nonpharmacologic comfort measures as appropriate  - Advance diet as tolerated, if ordered  - Obtain nutritional consult as needed  - Evaluate fluid balance  Outcome: Completed  Goal: Maintains or returns to baseline bowel

## 2019-11-02 NOTE — LACTATION NOTE
LACTATION NOTE - MOTHER      Evaluation Type: Inpatient    Problems identified  Problems identified: Knowledge deficit    Maternal history  Maternal history: AMA; Anxiety;Depression; Anemia  Other/comment: hx PP depression    Breastfeeding goal  Breastfeedin

## 2019-11-03 NOTE — ADDENDUM NOTE
Encounter addended by: Rishi Gomez DO on: 11/2/2019 7:30 PM   Actions taken: Clinical Note Signed, Charge Capture section accepted

## 2019-11-08 NOTE — TELEPHONE ENCOUNTER
Spoke to pt and she confirmed that she stopped working on 10/18 as stated by the disab rep from [facility].  Revised Disab form and re-faxed to [facility] - [phone]

## 2019-11-13 NOTE — TELEPHONE ENCOUNTER
Spoke with Fabiola mello [employer]. I explained again that we never received a fax from them and that we did received a fax from [employer] on 10/23/19 that will faxed back, and that pt requested a printed order for a breast pump on 10/9.   Fabiola vargas

## 2019-11-13 NOTE — TELEPHONE ENCOUNTER
Joann Lozoya from [address] [place] [address] [address] [facility] is calling to speak to a nurse

## 2019-12-06 NOTE — ADDENDUM NOTE
Encounter addended by: Abebe Burns DO on: 12/6/2019 10:31 AM   Actions taken: Clinical Note Signed, Charge Capture section accepted

## 2019-12-11 ENCOUNTER — POSTPARTUM (OUTPATIENT)
Dept: OBGYN CLINIC | Facility: CLINIC | Age: 34
End: 2019-12-11
Payer: COMMERCIAL

## 2019-12-11 VITALS
WEIGHT: 197 LBS | BODY MASS INDEX: 32 KG/M2 | HEART RATE: 62 BPM | SYSTOLIC BLOOD PRESSURE: 105 MMHG | DIASTOLIC BLOOD PRESSURE: 71 MMHG

## 2019-12-11 PROBLEM — N13.30 PYELECTASIS: Status: RESOLVED | Noted: 2019-07-19 | Resolved: 2019-12-11

## 2019-12-11 PROBLEM — N83.8 OVARIAN MASS, RIGHT: Status: RESOLVED | Noted: 2019-07-19 | Resolved: 2019-12-11

## 2019-12-11 PROBLEM — B95.1 POSITIVE GBS TEST: Status: RESOLVED | Noted: 2019-10-11 | Resolved: 2019-12-11

## 2019-12-11 PROBLEM — Z86.19 H/O HERPES GENITALIS: Status: RESOLVED | Noted: 2019-03-27 | Resolved: 2019-12-11

## 2019-12-11 PROBLEM — Z20.821 EXPOSURE TO ZIKA VIRUS: Status: RESOLVED | Noted: 2019-03-27 | Resolved: 2019-12-11

## 2019-12-11 PROBLEM — O09.521 MULTIGRAVIDA OF ADVANCED MATERNAL AGE IN FIRST TRIMESTER: Status: RESOLVED | Noted: 2019-03-27 | Resolved: 2019-12-11

## 2019-12-11 PROBLEM — O40.3XX0 POLYHYDRAMNIOS IN THIRD TRIMESTER: Status: RESOLVED | Noted: 2019-09-25 | Resolved: 2019-12-11

## 2019-12-11 RX ORDER — ETONOGESTREL AND ETHINYL ESTRADIOL 11.7; 2.7 MG/1; MG/1
INSERT, EXTENDED RELEASE VAGINAL
Qty: 3 EACH | Refills: 1 | Status: SHIPPED | OUTPATIENT
Start: 2019-12-11 | End: 2020-09-14

## 2019-12-11 NOTE — PROGRESS NOTES
DOMITILA    Ricardo Spring is a 37year old female [id number] here for 6 week post-partum visit. Patient delivered a  male infant on 10/29/19  by [facility] for failure to progress and polyhydramnios. Patient desires nuvaring for contraception.   Patient is breast position, mobility, without tenderness  Adnexa: normal without masses or tenderness  Perineum: well healed perineum  Anus: no hemorroids       ASSESSMENT/PLAN  Normal Post partum exam  Rx 6 month Nuvaring. Had unprotected intercourse yesterday.   Will use

## 2020-01-17 NOTE — PROGRESS NOTES
Faxed [facility] to [facility] [place] for pts last eye exam.   Copy in Dr Cele Montalvo. Pt for 1st tri screen  Denies pregnancy complaints  CFDNA drawn  Pt zika exposure denies bites s/s

## 2020-03-17 ENCOUNTER — TELEPHONE (OUTPATIENT)
Dept: OBGYN CLINIC | Facility: CLINIC | Age: 35
End: 2020-03-17

## 2020-03-17 NOTE — TELEPHONE ENCOUNTER
Pt asking to schedule appt for yeast infection. Assisted pt with scheduling first available appt with CECILIA for 3/18/2020 at 820am. Pt verbalized understanding.

## 2020-03-18 ENCOUNTER — OFFICE VISIT (OUTPATIENT)
Dept: OBGYN CLINIC | Facility: CLINIC | Age: 35
End: 2020-03-18
Payer: COMMERCIAL

## 2020-03-18 VITALS
SYSTOLIC BLOOD PRESSURE: 111 MMHG | WEIGHT: 203 LBS | BODY MASS INDEX: 33 KG/M2 | DIASTOLIC BLOOD PRESSURE: 68 MMHG | HEART RATE: 73 BPM

## 2020-03-18 DIAGNOSIS — N76.0 VAGINITIS AND VULVOVAGINITIS: Primary | ICD-10-CM

## 2020-03-18 PROCEDURE — 99213 OFFICE O/P EST LOW 20 MIN: CPT | Performed by: OBSTETRICS & GYNECOLOGY

## 2020-03-18 RX ORDER — FLUCONAZOLE 150 MG/1
150 TABLET ORAL ONCE
Qty: 1 TABLET | Refills: 0 | Status: SHIPPED | OUTPATIENT
Start: 2020-03-18 | End: 2020-03-18

## 2020-03-18 NOTE — PROGRESS NOTES
Daniele Hyde is a 38year old female [id number] Patient's last menstrual period was 02/29/2020. Patient presents with:  Gyn Problem: POSSIBLE VAGINAL INFECTION    Last seen 12/11/19 for PP exam.   Having vulvar itching and thick white discharge since 3/15. Pulse 73   Wt 203 lb (92.1 kg)   LMP 02/29/2020   BMI 32.77 kg/m²   Body mass index is 32.77 kg/m². Constitutional: well developed, well nourished       Pelvic Exam:  External Genitalia: normal appearance, hair distribution, and no lesions.   Generalized

## 2020-03-20 LAB
GENITAL VAGINOSIS SCREEN: NEGATIVE
TRICHOMONAS SCREEN: NEGATIVE

## 2020-04-21 RX ORDER — FLUCONAZOLE 150 MG/1
TABLET ORAL
Qty: 1 TABLET | Refills: 0 | OUTPATIENT
Start: 2020-04-21

## 2020-04-23 ENCOUNTER — TELEPHONE (OUTPATIENT)
Dept: OBGYN CLINIC | Facility: CLINIC | Age: 35
End: 2020-04-23

## 2020-04-23 RX ORDER — FLUCONAZOLE 150 MG/1
150 TABLET ORAL ONCE
Qty: 1 TABLET | Refills: 0 | Status: SHIPPED | OUTPATIENT
Start: 2020-04-23 | End: 2020-04-23

## 2020-04-23 NOTE — TELEPHONE ENCOUNTER
Diflucan 150 mg x 1. If she still has nystatin/triamcinlone cream from last visit, she can use it bid.

## 2020-04-23 NOTE — TELEPHONE ENCOUNTER
C/O \"Vaginal discomfort pain with urination, vaginal itching and burning with urination\" when vaginal area comes in contact with urine. States creamy vaginal d/c. Denies any odor. Denies urinary frequency, denies backache or fever.  States has not tried a

## 2020-07-14 NOTE — TELEPHONE ENCOUNTER
RE: Jaspreet Onofre Navigator Order   Received: 1 year ago   Message Contents   Paul Mayberry, [facility]  Edd Winchester RN; Halle Rivera MD             Hi Dr. Sam Aldrich and Tulio Arteaga,     I received your navigation order for behavioral health services.  I have reached out to y

## 2020-07-15 NOTE — TELEPHONE ENCOUNTER
Caroline Giraldo, [facility]  Loretta Santos MD; Deneen Leslie RN             Hi Dr. Raissa Nur and Candido Craig spoke with your patient and think that she would benefit from counseling services.  I provided her with referrals to:     Leodan Selby, [facility]

## 2020-08-14 ENCOUNTER — TELEPHONE (OUTPATIENT)
Dept: OBGYN CLINIC | Facility: CLINIC | Age: 35
End: 2020-08-14

## 2020-08-14 NOTE — TELEPHONE ENCOUNTER
Pharmacy sent a rx request for [phone], generic for [address]. [facility], [phone])  Pharmacy informed that we need the pt to call us and schedule an appt for her annual.  Rx was sent 12/2019 for 3 and on

## 2020-09-14 ENCOUNTER — OFFICE VISIT (OUTPATIENT)
Dept: OBGYN CLINIC | Facility: CLINIC | Age: 35
End: 2020-09-14
Payer: MEDICAID

## 2020-09-14 ENCOUNTER — TELEPHONE (OUTPATIENT)
Dept: OBGYN CLINIC | Facility: CLINIC | Age: 35
End: 2020-09-14

## 2020-09-14 VITALS
HEART RATE: 85 BPM | SYSTOLIC BLOOD PRESSURE: 137 MMHG | WEIGHT: 234 LBS | BODY MASS INDEX: 38 KG/M2 | DIASTOLIC BLOOD PRESSURE: 86 MMHG

## 2020-09-14 DIAGNOSIS — N76.2 ACUTE VULVITIS: Primary | ICD-10-CM

## 2020-09-14 PROCEDURE — 3079F DIAST BP 80-89 MM HG: CPT | Performed by: OBSTETRICS & GYNECOLOGY

## 2020-09-14 PROCEDURE — 3075F SYST BP GE 130 - 139MM HG: CPT | Performed by: OBSTETRICS & GYNECOLOGY

## 2020-09-14 PROCEDURE — 99213 OFFICE O/P EST LOW 20 MIN: CPT | Performed by: OBSTETRICS & GYNECOLOGY

## 2020-09-14 RX ORDER — VALACYCLOVIR HYDROCHLORIDE 500 MG/1
TABLET, FILM COATED ORAL
COMMUNITY
Start: 2020-05-18

## 2020-09-14 NOTE — TELEPHONE ENCOUNTER
Pt state sshe has had frequent yeast infections in the past.  Pt states his time the symptoms are different. Pt states she had intercourse Wednesday and had a burning sensation afterward. States her labia feels sensitive and irritated.   Pt states she had

## 2020-09-14 NOTE — PROGRESS NOTES
Sin Noble is a 38year old female [id number] Patient's last menstrual period was 08/17/2020 (exact date). Patient presents with:  Gyn Problem: c/o vaginal burning and irritation, itching and spotting     Last seen 3/18/20.    Having burning since last We (Exact Date)   BMI 37.77 kg/m²   Body mass index is 37.77 kg/m². Constitutional: well developed, well nourished       Pelvic Exam:  External Genitalia: normal appearance, hair distribution, and no lesions.    Labia minora erythematous  Urethral Meatus:

## 2020-09-16 ENCOUNTER — TELEPHONE (OUTPATIENT)
Dept: OBGYN CLINIC | Facility: CLINIC | Age: 35
End: 2020-09-16

## 2020-09-16 LAB
GENITAL VAGINOSIS SCREEN: NEGATIVE
TRICHOMONAS SCREEN: NEGATIVE

## 2020-09-16 NOTE — TELEPHONE ENCOUNTER
Received prior auth form for pts nystatin-triamcinolone cream from [address] Called pharmacy and spoke with Leyda Davis. Asked Leyda Davis for pts RX to be split into two separate medications so that it is covered by her insurance.  Leyda Davis verbalized understandin

## 2020-09-18 ENCOUNTER — TELEPHONE (OUTPATIENT)
Dept: OBGYN CLINIC | Facility: CLINIC | Age: 35
End: 2020-09-18

## 2020-09-18 RX ORDER — FLUCONAZOLE 150 MG/1
150 TABLET ORAL ONCE
Qty: 1 TABLET | Refills: 0 | Status: SHIPPED | OUTPATIENT
Start: 2020-09-18 | End: 2020-09-18

## 2021-01-29 ENCOUNTER — TELEPHONE (OUTPATIENT)
Dept: OBGYN CLINIC | Facility: CLINIC | Age: 36
End: 2021-01-29

## 2021-01-29 NOTE — TELEPHONE ENCOUNTER
Pt asking what [facility] will [facility] at [facility] with AKILAH for fertility consult. Informed pt unsure of what exact tests AKILAH will order if any. Informed pt AK[facility]AH will discuss with [facility] at [facility]. Pt verbalized understanding.

## 2021-02-18 ENCOUNTER — OFFICE VISIT (OUTPATIENT)
Dept: OBGYN CLINIC | Facility: CLINIC | Age: 36
End: 2021-02-18
Payer: MEDICAID

## 2021-02-18 VITALS
WEIGHT: 246.63 LBS | HEART RATE: 86 BPM | SYSTOLIC BLOOD PRESSURE: 125 MMHG | DIASTOLIC BLOOD PRESSURE: 83 MMHG | BODY MASS INDEX: 40 KG/M2

## 2021-02-18 DIAGNOSIS — N97.9 FEMALE INFERTILITY: Primary | ICD-10-CM

## 2021-02-18 DIAGNOSIS — Z32.00 PREGNANCY EXAMINATION OR TEST, PREGNANCY UNCONFIRMED: ICD-10-CM

## 2021-02-18 LAB — CONTROL LINE PRESENT WITH A CLEAR BACKGROUND (YES/NO): YES YES/NO

## 2021-02-18 PROCEDURE — 99213 OFFICE O/P EST LOW 20 MIN: CPT | Performed by: OBSTETRICS & GYNECOLOGY

## 2021-02-18 PROCEDURE — 3079F DIAST BP 80-89 MM HG: CPT | Performed by: OBSTETRICS & GYNECOLOGY

## 2021-02-18 PROCEDURE — 81025 URINE PREGNANCY TEST: CPT | Performed by: OBSTETRICS & GYNECOLOGY

## 2021-02-18 PROCEDURE — 3074F SYST BP LT 130 MM HG: CPT | Performed by: OBSTETRICS & GYNECOLOGY

## 2021-02-18 NOTE — H&P
HPI:  The patient is a 42 yo F here for infertility discussion. She stopped nuvaring in July. Monthly cycles 5 days light flow.  and mongamous.  33 yo. Doesn't smoke cigs/illicits. He is the father of her last child.   She and  webb since quittin.0      Smokeless tobacco: Never Used    Substance and Sexual Activity      Alcohol use: No        Frequency: Never        Comment: Before pregnancy would have 2-6 drinks at a party       Drug use: No      Sexual activity: Not on file    L 02/18/21  1422   BP: 125/83   Pulse: 86       PHYSICAL EXAM:   Constitutional: well developed, well nourished  Head/Face: normocephalic  Psychiatric: Appropriate mood and affect    Assessment/Plan:    Porsche Howard was seen today for consult.     Diagnoses and a

## 2021-03-30 ENCOUNTER — HOSPITAL ENCOUNTER (OUTPATIENT)
Dept: ULTRASOUND IMAGING | Facility: HOSPITAL | Age: 36
Discharge: HOME | End: 2021-03-30
Attending: OBSTETRICS & GYNECOLOGY
Payer: MEDICAID

## 2021-03-30 DIAGNOSIS — N97.9 FEMALE INFERTILITY: ICD-10-CM

## 2021-03-30 PROCEDURE — 76830 TRANSVAGINAL US NON-OB: CPT | Performed by: OBSTETRICS & GYNECOLOGY

## 2021-03-30 PROCEDURE — 76856 US EXAM PELVIC COMPLETE: CPT | Performed by: OBSTETRICS & GYNECOLOGY

## 2021-04-02 ENCOUNTER — IMMUNIZATION (OUTPATIENT)
Dept: LAB | Age: 36
End: 2021-04-02
Attending: HOSPITALIST
Payer: COMMERCIAL

## 2021-04-02 DIAGNOSIS — Z23 NEED FOR VACCINATION: Primary | ICD-10-CM

## 2021-04-02 PROCEDURE — 0001A SARSCOV2 VAC 30MCG/0.3ML IM: CPT

## 2021-04-15 ENCOUNTER — OFFICE VISIT (OUTPATIENT)
Dept: OBGYN CLINIC | Facility: CLINIC | Age: 36
End: 2021-04-15
Payer: MEDICAID

## 2021-04-15 ENCOUNTER — TELEPHONE (OUTPATIENT)
Dept: OBGYN CLINIC | Facility: CLINIC | Age: 36
End: 2021-04-15

## 2021-04-15 VITALS
WEIGHT: 236 LBS | SYSTOLIC BLOOD PRESSURE: 110 MMHG | HEIGHT: 66 IN | BODY MASS INDEX: 37.93 KG/M2 | HEART RATE: 74 BPM | DIASTOLIC BLOOD PRESSURE: 75 MMHG

## 2021-04-15 DIAGNOSIS — Z32.01 POSITIVE PREGNANCY TEST: Primary | ICD-10-CM

## 2021-04-15 PROCEDURE — 3008F BODY MASS INDEX DOCD: CPT | Performed by: OBSTETRICS & GYNECOLOGY

## 2021-04-15 PROCEDURE — 81025 URINE PREGNANCY TEST: CPT | Performed by: OBSTETRICS & GYNECOLOGY

## 2021-04-15 PROCEDURE — 99213 OFFICE O/P EST LOW 20 MIN: CPT | Performed by: OBSTETRICS & GYNECOLOGY

## 2021-04-15 PROCEDURE — 3078F DIAST BP <80 MM HG: CPT | Performed by: OBSTETRICS & GYNECOLOGY

## 2021-04-15 PROCEDURE — 3074F SYST BP LT 130 MM HG: CPT | Performed by: OBSTETRICS & GYNECOLOGY

## 2021-04-15 NOTE — H&P
HPI:  The patient is a 42 yo F here for annual.  Menses 2 weeks late. LMP: 3/3/21. +UPT in office today. Also wants to discuss US which was done for infertility.       LPS: 3/27/19 pap neg    Reviewed medical and surgical history below       OBSTETRICS HI Transportation Needs:       Lack of Transportation (Medical):       Lack of Transportation (Non-Medical):   Physical Activity:       Days of Exercise per Week:       Minutes of Exercise per Session:   Stress:       Feeling of Stress :   Social Connection TEST        UPT positive in office today. Exam deferred since she will have full PE at time of new OB visit. WE reviewed her pelvic US. She has a normal uterus with c scar scar noted. She has a known 3.1cm dermoid cyst on the right ovary.       All

## 2021-04-15 NOTE — TELEPHONE ENCOUNTER
LMP 3-3-21. Pt saw Yesi Garcia and had a positive preg test.  (Pt is not breastfeeding.)  Ht is 5'6\" and Wt is 232lb. Pt agrees to see all Mds. Pt will start a pnv with dha. Pt scheduled an obn pc appt.   Pt given the date and time that we will give her a ca

## 2021-04-20 ENCOUNTER — PATIENT MESSAGE (OUTPATIENT)
Dept: OBGYN CLINIC | Facility: CLINIC | Age: 36
End: 2021-04-20

## 2021-04-21 NOTE — TELEPHONE ENCOUNTER
From: Rico Vu  To: [facility], DO  Sent: 4/20/2021 8:10 PM CDT  Subject: Prescription Question    Good Evening     What type of prenatal vitamins should I buy? Do you recommend a brand ? Can I get a prescription?  I used those for my other pr

## 2021-04-24 ENCOUNTER — IMMUNIZATION (OUTPATIENT)
Dept: LAB | Age: 36
End: 2021-04-24
Attending: HOSPITALIST
Payer: COMMERCIAL

## 2021-04-24 DIAGNOSIS — Z23 NEED FOR VACCINATION: Primary | ICD-10-CM

## 2021-04-24 PROCEDURE — 0002A SARSCOV2 VAC 30MCG/0.3ML IM: CPT

## 2021-04-27 ENCOUNTER — TELEPHONE (OUTPATIENT)
Dept: OBGYN CLINIC | Facility: CLINIC | Age: 36
End: 2021-04-27

## 2021-04-27 ENCOUNTER — NURSE ONLY (OUTPATIENT)
Dept: OBGYN CLINIC | Facility: CLINIC | Age: 36
End: 2021-04-27
Payer: MEDICAID

## 2021-04-27 DIAGNOSIS — Z34.91 FIRST TRIMESTER PREGNANCY: Primary | ICD-10-CM

## 2021-04-27 NOTE — PROGRESS NOTES
Pt seen for OBN-PCappt today with no complaints. Normal PN labs and 1 hr GTT labs ordered. Pt had hep C done with last pregnancy in 2019 and no new tattoos since 2019.  Pt advised all labs must be completed and resulted prior to MD appt or appt will be ca Drug usage in prior year No    Alcohol Yes Beer a few times before knowing was pregnant. Last drink was on 3/13/21   Would you accept a blood transfusion? If no, are you a Rastafarian?  Yes                INFECTION HISTORY    Chlamydia No    Pt or p risk factors are present. Pt refused referral to [facility]. Pt. Education was provided on OUD and pregnancy, including the benefits of treatment for mom and baby and the risk of CEFERINO. Pt. Given OUD and CEFERINO handouts.

## 2021-04-27 NOTE — TELEPHONE ENCOUNTER
7w6d seen for OBN-PC and requesting FTS. Pt aware referral will be processed after NPN. Request sent to referral staff.

## 2021-04-28 ENCOUNTER — LAB ENCOUNTER (OUTPATIENT)
Dept: LAB | Facility: REFERENCE LAB | Age: 36
End: 2021-04-28
Attending: OBSTETRICS & GYNECOLOGY
Payer: MEDICAID

## 2021-04-28 DIAGNOSIS — Z34.91 FIRST TRIMESTER PREGNANCY: ICD-10-CM

## 2021-04-28 DIAGNOSIS — Z34.91 ENCOUNTER FOR SUPERVISION OF NORMAL PREGNANCY IN FIRST TRIMESTER: Primary | ICD-10-CM

## 2021-04-28 PROCEDURE — 87086 URINE CULTURE/COLONY COUNT: CPT

## 2021-04-28 PROCEDURE — 86900 BLOOD TYPING SEROLOGIC ABO: CPT

## 2021-04-28 PROCEDURE — 36415 COLL VENOUS BLD VENIPUNCTURE: CPT

## 2021-04-28 PROCEDURE — 82950 GLUCOSE TEST: CPT | Performed by: OBSTETRICS & GYNECOLOGY

## 2021-04-28 PROCEDURE — 85025 COMPLETE CBC W/AUTO DIFF WBC: CPT | Performed by: OBSTETRICS & GYNECOLOGY

## 2021-04-28 PROCEDURE — 87389 HIV-1 AG W/HIV-1&-2 AB AG IA: CPT | Performed by: OBSTETRICS & GYNECOLOGY

## 2021-04-28 PROCEDURE — 86762 RUBELLA ANTIBODY: CPT

## 2021-04-28 PROCEDURE — 86780 TREPONEMA PALLIDUM: CPT | Performed by: OBSTETRICS & GYNECOLOGY

## 2021-04-28 PROCEDURE — 87340 HEPATITIS B SURFACE AG IA: CPT | Performed by: OBSTETRICS & GYNECOLOGY

## 2021-04-28 PROCEDURE — 86850 RBC ANTIBODY SCREEN: CPT

## 2021-04-28 PROCEDURE — 86901 BLOOD TYPING SEROLOGIC RH(D): CPT

## 2021-05-07 ENCOUNTER — APPOINTMENT (OUTPATIENT)
Dept: ULTRASOUND IMAGING | Facility: HOSPITAL | Age: 36
End: 2021-05-07
Attending: NURSE PRACTITIONER
Payer: MEDICAID

## 2021-05-07 ENCOUNTER — HOSPITAL ENCOUNTER (EMERGENCY)
Facility: HOSPITAL | Age: 36
Discharge: HOME | End: 2021-05-08
Attending: EMERGENCY MEDICINE
Payer: MEDICAID

## 2021-05-07 ENCOUNTER — TELEPHONE (OUTPATIENT)
Dept: OBGYN CLINIC | Facility: CLINIC | Age: 36
End: 2021-05-07

## 2021-05-07 VITALS
SYSTOLIC BLOOD PRESSURE: 138 MMHG | DIASTOLIC BLOOD PRESSURE: 85 MMHG | HEART RATE: 82 BPM | OXYGEN SATURATION: 97 % | TEMPERATURE: 98 F | RESPIRATION RATE: 18 BRPM

## 2021-05-07 DIAGNOSIS — O26.859 SPOTTING IN PREGNANCY: Primary | ICD-10-CM

## 2021-05-07 DIAGNOSIS — O46.90 VAGINAL BLEEDING DURING PREGNANCY: Primary | ICD-10-CM

## 2021-05-07 PROCEDURE — 81025 URINE PREGNANCY TEST: CPT

## 2021-05-07 PROCEDURE — 81001 URINALYSIS AUTO W/SCOPE: CPT | Performed by: EMERGENCY MEDICINE

## 2021-05-07 PROCEDURE — 76817 TRANSVAGINAL US OBSTETRIC: CPT | Performed by: NURSE PRACTITIONER

## 2021-05-07 PROCEDURE — 84703 CHORIONIC GONADOTROPIN ASSAY: CPT | Performed by: EMERGENCY MEDICINE

## 2021-05-07 PROCEDURE — 80048 BASIC METABOLIC PNL TOTAL CA: CPT | Performed by: NURSE PRACTITIONER

## 2021-05-07 PROCEDURE — 76801 OB US < 14 WKS SINGLE FETUS: CPT | Performed by: NURSE PRACTITIONER

## 2021-05-07 PROCEDURE — 85025 COMPLETE CBC W/AUTO DIFF WBC: CPT | Performed by: NURSE PRACTITIONER

## 2021-05-07 PROCEDURE — 84702 CHORIONIC GONADOTROPIN TEST: CPT | Performed by: EMERGENCY MEDICINE

## 2021-05-07 PROCEDURE — 96360 HYDRATION IV INFUSION INIT: CPT

## 2021-05-07 PROCEDURE — 99284 EMERGENCY DEPT VISIT MOD MDM: CPT

## 2021-05-07 NOTE — TELEPHONE ENCOUNTER
9w2d by LMP c/w spotting x 3 days. Had IC Tuesday. Pink spotting Wednesday and now brown DC. NO pain. Mild cramping. Recs for HCG. Has appt Tuesday for new OB.  AB+.   Please check on pt in AM

## 2021-05-08 NOTE — TELEPHONE ENCOUNTER
Given no Fetal pole or heart tones, there is no need for New OB Tuesday. We need to evaluate if this is a viable pregnancy. Trend quants for now. If downtrending we will discuss.   If increasing then we will repeat US in 7-10 days from last.

## 2021-05-08 NOTE — TELEPHONE ENCOUNTER
Called pt. She states she ended up going to the ER last night with spotting and cramping. Per pt, US showed no FHT and no fetal pole. Hcg drawn. ER hcg 12,557. Pt was told to have serial hcgs and f/u with OB.  Pt states she continues to have dark reddish br

## 2021-05-08 NOTE — ED PROVIDER NOTES
Patient Seen in: [facility] AND [facility] Emergency Department      History   Patient presents with:  Pregnancy Issues    Stated Complaint: 9wks preg bleeding     HPI/Subjective:   HPI  41-year-old female G5, P3 at 9 weeks gestation here with complaints of vagi Psychiatric/Behavioral: Negative for suicidal ideas. All other systems reviewed and are negative. Positive for stated complaint: 9wks preg bleeding   Other systems are as noted in HPI. Constitutional and vital signs reviewed.       All other syste (H) 35.1 - 46.3 fL    RDW 15.1 (H) 11.0 - 15.0 %    .0 150.0 - 450.0 10(3)uL    Neutrophil Absolute Prelim 6.45 1.50 - 7.70 x10 (3) uL    Neutrophil Absolute 6.45 1.50 - 7.70 x10(3) uL    Lymphocyte Absolute 3.51 1.00 - 4.00 x10(3) uL    Monocyte Ab >1.030 (H) 1.001 - 1.030    Glucose Urine Negative Negative mg/dL    Bilirubin Urine Negative Negative    Ketones Urine Negative Negative mg/dL    Blood Urine Large (A) Negative    pH Urine 6.0 5.0 - 8.0    Protein Urine 30  (A) Negative mg/dL    [facility] imaging and found positive pregnancy test, leukocytosis, no anemia, electrolytes reassuring, rare bacteriuria, US with gestational sac without fetal pole, HCG [id number]  - discussed need for close outpt followup        The patient was informed of their elevat

## 2021-05-08 NOTE — ED QUICK NOTES
Assumed care of pt aox3 c/o vag bleeding with some cramping today. Pt is 9 weeks pregnant. Dark red blood with clots per pt. Pt denies nausea vomiting or diarrhea. Pt denies weakness or any other c/o at this time.

## 2021-05-10 ENCOUNTER — LAB ENCOUNTER (OUTPATIENT)
Dept: LAB | Facility: HOSPITAL | Age: 36
End: 2021-05-10
Attending: OBSTETRICS & GYNECOLOGY
Payer: MEDICAID

## 2021-05-10 ENCOUNTER — TELEPHONE (OUTPATIENT)
Dept: OBGYN CLINIC | Facility: CLINIC | Age: 36
End: 2021-05-10

## 2021-05-10 PROCEDURE — 36415 COLL VENOUS BLD VENIPUNCTURE: CPT | Performed by: OBSTETRICS & GYNECOLOGY

## 2021-05-10 PROCEDURE — 84702 CHORIONIC GONADOTROPIN TEST: CPT | Performed by: OBSTETRICS & GYNECOLOGY

## 2021-05-10 NOTE — TELEPHONE ENCOUNTER
I spoke with Gorge Guillermo and discussed bleeding / pain. She has maintained moderate bleeding with the intermittent cramp episodes. The HCG has dropped significantly as expected based on history from yesterday.  Therefore I'd like her to observe bleeding and pa

## 2021-05-10 NOTE — TELEPHONE ENCOUNTER
Pt is 9w5d. Pt is having active bleeding states , last night and this morning was changing peripad ever hr and last night passed a clot the size of a plum. Pt states this morning she passed a clot the size of a golf ball.  Pt state now the  bleeding has slo

## 2021-05-12 ENCOUNTER — TELEPHONE (OUTPATIENT)
Dept: OBGYN CLINIC | Facility: CLINIC | Age: 36
End: 2021-05-12

## 2021-05-12 DIAGNOSIS — O20.0 THREATENED ABORTION, ANTEPARTUM: Primary | ICD-10-CM

## 2021-05-12 NOTE — TELEPHONE ENCOUNTER
Pt states that bleeding is like a heavy period changing pads 3-4 hrs, not soaking a pad. Pt denies clots and cramping. Denies any SOB, chest pain, or heart palpitations.  Pt did states that when walking backwards today she felt lightheaded and dizzy, but wa

## 2021-05-12 NOTE — TELEPHONE ENCOUNTER
----- Message from Robin Meredith DO sent at 5/11/2021  3:19 PM CDT -----  HCGs q1-2wks down to zero.   See DENNY msg from yesterday

## 2021-05-12 NOTE — TELEPHONE ENCOUNTER
Pt states the bleeding has slowed a bit. She changes a pad every 4-5/6 hours. Pt states pads are uncomfortable and is asking if she can use tampons. Pt advised no, she should stick to pads.   PT also instructed to refrain from intercourse until bleeding

## 2021-05-17 ENCOUNTER — LAB ENCOUNTER (OUTPATIENT)
Dept: LAB | Facility: HOSPITAL | Age: 36
End: 2021-05-17
Attending: OBSTETRICS & GYNECOLOGY
Payer: MEDICAID

## 2021-05-17 DIAGNOSIS — O20.0 THREATENED ABORTION, ANTEPARTUM: ICD-10-CM

## 2021-05-17 PROCEDURE — 84702 CHORIONIC GONADOTROPIN TEST: CPT

## 2021-05-17 PROCEDURE — 36415 COLL VENOUS BLD VENIPUNCTURE: CPT

## 2021-05-24 ENCOUNTER — LAB ENCOUNTER (OUTPATIENT)
Dept: LAB | Facility: HOSPITAL | Age: 36
End: 2021-05-24
Attending: OBSTETRICS & GYNECOLOGY
Payer: MEDICAID

## 2021-05-24 DIAGNOSIS — O20.0 THREATENED ABORTION, ANTEPARTUM: ICD-10-CM

## 2021-05-24 PROCEDURE — 36415 COLL VENOUS BLD VENIPUNCTURE: CPT

## 2021-05-24 PROCEDURE — 84702 CHORIONIC GONADOTROPIN TEST: CPT

## 2021-11-17 NOTE — TELEPHONE ENCOUNTER
Patient scheduled fertility consult on 2/10/2021, patient asking what type of testing will be done. Please call at:[phone],thanks. 19-Oct-2021
